# Patient Record
Sex: FEMALE | Race: BLACK OR AFRICAN AMERICAN | NOT HISPANIC OR LATINO | Employment: FULL TIME | URBAN - METROPOLITAN AREA
[De-identification: names, ages, dates, MRNs, and addresses within clinical notes are randomized per-mention and may not be internally consistent; named-entity substitution may affect disease eponyms.]

---

## 2018-11-21 ENCOUNTER — TELEPHONE (OUTPATIENT)
Dept: OBSTETRICS AND GYNECOLOGY | Facility: CLINIC | Age: 38
End: 2018-11-21

## 2018-11-21 NOTE — TELEPHONE ENCOUNTER
Left message on VM    Received call yesterday from Dr. Radha Eric's office requesting scheduling evaluation and surgery for joint case and sling in Jan.     Patient will need cystometrics at appointment per Dr. Hunter. Left message on patient answering machine to call office and schedule

## 2019-01-02 ENCOUNTER — TELEPHONE (OUTPATIENT)
Dept: OBSTETRICS AND GYNECOLOGY | Facility: CLINIC | Age: 39
End: 2019-01-02

## 2019-01-02 NOTE — TELEPHONE ENCOUNTER
Spoke with patient  Appointment for cystometrics rescheduled. Discussed this needs to be done prior to surgery on 1/21/19. Voiced understanding. New location reviewed.

## 2019-01-14 ENCOUNTER — TELEPHONE (OUTPATIENT)
Dept: OBSTETRICS AND GYNECOLOGY | Facility: CLINIC | Age: 39
End: 2019-01-14

## 2019-01-14 NOTE — TELEPHONE ENCOUNTER
----- Message from Rena Glass sent at 1/14/2019 10:10 AM CST -----  Contact: Page treviño/ Dr Radha Eric   Type: Needs Medical Advice    Who Called:  Page treviño/ Dr Radha Eric   Best Call Back Number: Page at , ext [phone]  Additional Information: Calling to speak with the Nurse in regards to the patient cancelling the appt on 1/21/19 with Dr Eric / Dr Hunter. Patient can't afford to take off from work, but she'll call back to reschedule. Please advise.     Surgery canceled by Dr. Eric's office. They are aware patient will need bladder testing and appt with Dr. Hunter prior to scheduling surgery.

## 2020-08-07 ENCOUNTER — LAB VISIT (OUTPATIENT)
Dept: PRIMARY CARE CLINIC | Facility: OTHER | Age: 40
End: 2020-08-07
Attending: INTERNAL MEDICINE
Payer: MEDICAID

## 2020-08-07 DIAGNOSIS — Z11.59 SPECIAL SCREENING EXAMINATION FOR UNSPECIFIED VIRAL DISEASE: Primary | ICD-10-CM

## 2020-08-07 PROCEDURE — U0003 INFECTIOUS AGENT DETECTION BY NUCLEIC ACID (DNA OR RNA); SEVERE ACUTE RESPIRATORY SYNDROME CORONAVIRUS 2 (SARS-COV-2) (CORONAVIRUS DISEASE [COVID-19]), AMPLIFIED PROBE TECHNIQUE, MAKING USE OF HIGH THROUGHPUT TECHNOLOGIES AS DESCRIBED BY CMS-2020-01-R: HCPCS

## 2020-08-12 DIAGNOSIS — U07.1 COVID-19 VIRUS DETECTED: ICD-10-CM

## 2020-08-12 LAB — SARS-COV-2 RNA RESP QL NAA+PROBE: DETECTED

## 2020-12-04 DIAGNOSIS — Z01.84 ANTIBODY RESPONSE EXAMINATION: ICD-10-CM

## 2021-01-11 ENCOUNTER — CLINICAL SUPPORT (OUTPATIENT)
Dept: URGENT CARE | Facility: CLINIC | Age: 41
End: 2021-01-11
Payer: MEDICAID

## 2021-01-11 ENCOUNTER — TELEPHONE (OUTPATIENT)
Dept: PRIMARY CARE CLINIC | Facility: OTHER | Age: 41
End: 2021-01-11

## 2021-01-11 DIAGNOSIS — R53.83 FATIGUE, UNSPECIFIED TYPE: Primary | ICD-10-CM

## 2021-01-11 DIAGNOSIS — R51.9 NONINTRACTABLE HEADACHE, UNSPECIFIED CHRONICITY PATTERN, UNSPECIFIED HEADACHE TYPE: ICD-10-CM

## 2021-01-11 DIAGNOSIS — R11.0 NAUSEA: ICD-10-CM

## 2021-01-11 DIAGNOSIS — R53.83 FATIGUE, UNSPECIFIED TYPE: ICD-10-CM

## 2021-01-11 DIAGNOSIS — M79.10 MYALGIA: ICD-10-CM

## 2021-01-11 LAB
CTP QC/QA: YES
SARS-COV-2 RDRP RESP QL NAA+PROBE: NEGATIVE

## 2021-01-11 PROCEDURE — U0002: ICD-10-PCS | Mod: QW,S$GLB,, | Performed by: INTERNAL MEDICINE

## 2021-01-11 PROCEDURE — U0002 COVID-19 LAB TEST NON-CDC: HCPCS | Mod: QW,S$GLB,, | Performed by: INTERNAL MEDICINE

## 2021-04-01 ENCOUNTER — IMMUNIZATION (OUTPATIENT)
Dept: FAMILY MEDICINE | Facility: CLINIC | Age: 41
End: 2021-04-01
Payer: COMMERCIAL

## 2021-04-01 DIAGNOSIS — Z23 NEED FOR VACCINATION: Primary | ICD-10-CM

## 2021-04-01 PROCEDURE — 0001A COVID-19, MRNA, LNP-S, PF, 30 MCG/0.3 ML DOSE VACCINE: ICD-10-PCS | Mod: CV19,S$GLB,, | Performed by: FAMILY MEDICINE

## 2021-04-01 PROCEDURE — 91300 COVID-19, MRNA, LNP-S, PF, 30 MCG/0.3 ML DOSE VACCINE: CPT | Mod: S$GLB,,, | Performed by: FAMILY MEDICINE

## 2021-04-01 PROCEDURE — 91300 COVID-19, MRNA, LNP-S, PF, 30 MCG/0.3 ML DOSE VACCINE: ICD-10-PCS | Mod: S$GLB,,, | Performed by: FAMILY MEDICINE

## 2021-04-01 PROCEDURE — 0001A COVID-19, MRNA, LNP-S, PF, 30 MCG/0.3 ML DOSE VACCINE: CPT | Mod: CV19,S$GLB,, | Performed by: FAMILY MEDICINE

## 2021-04-22 ENCOUNTER — IMMUNIZATION (OUTPATIENT)
Dept: FAMILY MEDICINE | Facility: CLINIC | Age: 41
End: 2021-04-22
Payer: COMMERCIAL

## 2021-04-22 DIAGNOSIS — Z23 NEED FOR VACCINATION: Primary | ICD-10-CM

## 2021-04-22 PROCEDURE — 0002A COVID-19, MRNA, LNP-S, PF, 30 MCG/0.3 ML DOSE VACCINE: CPT | Mod: PBBFAC | Performed by: FAMILY MEDICINE

## 2021-04-22 PROCEDURE — 91300 COVID-19, MRNA, LNP-S, PF, 30 MCG/0.3 ML DOSE VACCINE: CPT | Mod: PBBFAC | Performed by: FAMILY MEDICINE

## 2021-05-17 PROBLEM — E66.01 CLASS 3 SEVERE OBESITY DUE TO EXCESS CALORIES WITH SERIOUS COMORBIDITY AND BODY MASS INDEX (BMI) OF 40.0 TO 44.9 IN ADULT: Status: ACTIVE | Noted: 2021-05-17

## 2021-05-17 PROBLEM — E66.813 CLASS 3 SEVERE OBESITY DUE TO EXCESS CALORIES WITH SERIOUS COMORBIDITY AND BODY MASS INDEX (BMI) OF 40.0 TO 44.9 IN ADULT: Status: ACTIVE | Noted: 2021-05-17

## 2021-05-17 PROBLEM — G47.33 OBSTRUCTIVE SLEEP APNEA SYNDROME: Status: ACTIVE | Noted: 2021-05-17

## 2021-06-05 ENCOUNTER — PATIENT MESSAGE (OUTPATIENT)
Dept: ADMINISTRATIVE | Facility: OTHER | Age: 41
End: 2021-06-05

## 2021-06-22 ENCOUNTER — LAB VISIT (OUTPATIENT)
Dept: LAB | Facility: HOSPITAL | Age: 41
End: 2021-06-22
Attending: FAMILY MEDICINE
Payer: COMMERCIAL

## 2021-06-22 DIAGNOSIS — Z00.00 ROUTINE GENERAL MEDICAL EXAMINATION AT HEALTH CARE FACILITY: ICD-10-CM

## 2021-06-22 LAB
ALBUMIN SERPL BCP-MCNC: 3.5 G/DL (ref 3.5–5.2)
ALP SERPL-CCNC: 72 U/L (ref 55–135)
ALT SERPL W/O P-5'-P-CCNC: 21 U/L (ref 10–44)
ANION GAP SERPL CALC-SCNC: 10 MMOL/L (ref 8–16)
AST SERPL-CCNC: 23 U/L (ref 10–40)
BASOPHILS # BLD AUTO: 0.05 K/UL (ref 0–0.2)
BASOPHILS NFR BLD: 0.5 % (ref 0–1.9)
BILIRUB SERPL-MCNC: 0.3 MG/DL (ref 0.1–1)
BUN SERPL-MCNC: 16 MG/DL (ref 6–20)
CALCIUM SERPL-MCNC: 9.5 MG/DL (ref 8.7–10.5)
CHLORIDE SERPL-SCNC: 106 MMOL/L (ref 95–110)
CHOLEST SERPL-MCNC: 182 MG/DL (ref 120–199)
CHOLEST/HDLC SERPL: 4.1 {RATIO} (ref 2–5)
CO2 SERPL-SCNC: 25 MMOL/L (ref 23–29)
CREAT SERPL-MCNC: 1.2 MG/DL (ref 0.5–1.4)
DIFFERENTIAL METHOD: ABNORMAL
EOSINOPHIL # BLD AUTO: 0.3 K/UL (ref 0–0.5)
EOSINOPHIL NFR BLD: 2.7 % (ref 0–8)
ERYTHROCYTE [DISTWIDTH] IN BLOOD BY AUTOMATED COUNT: 13.1 % (ref 11.5–14.5)
EST. GFR  (AFRICAN AMERICAN): >60 ML/MIN/1.73 M^2
EST. GFR  (NON AFRICAN AMERICAN): 55.9 ML/MIN/1.73 M^2
GLUCOSE SERPL-MCNC: 90 MG/DL (ref 70–110)
HCT VFR BLD AUTO: 41.2 % (ref 37–48.5)
HDLC SERPL-MCNC: 44 MG/DL (ref 40–75)
HDLC SERPL: 24.2 % (ref 20–50)
HGB BLD-MCNC: 12.7 G/DL (ref 12–16)
IMM GRANULOCYTES # BLD AUTO: 0.03 K/UL (ref 0–0.04)
IMM GRANULOCYTES NFR BLD AUTO: 0.3 % (ref 0–0.5)
LDLC SERPL CALC-MCNC: 111.6 MG/DL (ref 63–159)
LYMPHOCYTES # BLD AUTO: 3.4 K/UL (ref 1–4.8)
LYMPHOCYTES NFR BLD: 35.3 % (ref 18–48)
MCH RBC QN AUTO: 26.6 PG (ref 27–31)
MCHC RBC AUTO-ENTMCNC: 30.8 G/DL (ref 32–36)
MCV RBC AUTO: 86 FL (ref 82–98)
MONOCYTES # BLD AUTO: 0.7 K/UL (ref 0.3–1)
MONOCYTES NFR BLD: 6.9 % (ref 4–15)
NEUTROPHILS # BLD AUTO: 5.2 K/UL (ref 1.8–7.7)
NEUTROPHILS NFR BLD: 54.3 % (ref 38–73)
NONHDLC SERPL-MCNC: 138 MG/DL
NRBC BLD-RTO: 0 /100 WBC
PLATELET # BLD AUTO: 306 K/UL (ref 150–450)
PMV BLD AUTO: 10.2 FL (ref 9.2–12.9)
POTASSIUM SERPL-SCNC: 4.1 MMOL/L (ref 3.5–5.1)
PROT SERPL-MCNC: 7.6 G/DL (ref 6–8.4)
RBC # BLD AUTO: 4.77 M/UL (ref 4–5.4)
SODIUM SERPL-SCNC: 141 MMOL/L (ref 136–145)
TRIGL SERPL-MCNC: 132 MG/DL (ref 30–150)
TSH SERPL DL<=0.005 MIU/L-ACNC: 1.06 UIU/ML (ref 0.4–4)
WBC # BLD AUTO: 9.51 K/UL (ref 3.9–12.7)

## 2021-06-22 PROCEDURE — 36415 COLL VENOUS BLD VENIPUNCTURE: CPT | Mod: PO | Performed by: FAMILY MEDICINE

## 2021-06-22 PROCEDURE — 80061 LIPID PANEL: CPT | Performed by: FAMILY MEDICINE

## 2021-06-22 PROCEDURE — 84443 ASSAY THYROID STIM HORMONE: CPT | Performed by: FAMILY MEDICINE

## 2021-06-22 PROCEDURE — 85025 COMPLETE CBC W/AUTO DIFF WBC: CPT | Performed by: FAMILY MEDICINE

## 2021-06-22 PROCEDURE — 80053 COMPREHEN METABOLIC PANEL: CPT | Performed by: FAMILY MEDICINE

## 2021-07-08 ENCOUNTER — PATIENT MESSAGE (OUTPATIENT)
Dept: ADMINISTRATIVE | Facility: OTHER | Age: 41
End: 2021-07-08

## 2021-07-15 PROBLEM — L85.3 DRY SKIN DERMATITIS: Status: ACTIVE | Noted: 2021-07-15

## 2021-10-15 ENCOUNTER — LAB VISIT (OUTPATIENT)
Dept: PRIMARY CARE CLINIC | Facility: OTHER | Age: 41
End: 2021-10-15
Payer: MEDICAID

## 2021-10-15 DIAGNOSIS — R05.9 COUGH: ICD-10-CM

## 2021-10-15 PROCEDURE — U0003 INFECTIOUS AGENT DETECTION BY NUCLEIC ACID (DNA OR RNA); SEVERE ACUTE RESPIRATORY SYNDROME CORONAVIRUS 2 (SARS-COV-2) (CORONAVIRUS DISEASE [COVID-19]), AMPLIFIED PROBE TECHNIQUE, MAKING USE OF HIGH THROUGHPUT TECHNOLOGIES AS DESCRIBED BY CMS-2020-01-R: HCPCS | Performed by: FAMILY MEDICINE

## 2021-10-16 LAB
SARS-COV-2 RNA RESP QL NAA+PROBE: NOT DETECTED
SARS-COV-2- CYCLE NUMBER: NORMAL

## 2022-01-03 PROBLEM — M54.32 SCIATICA OF LEFT SIDE: Status: ACTIVE | Noted: 2022-01-03

## 2022-02-21 ENCOUNTER — LAB VISIT (OUTPATIENT)
Dept: LAB | Facility: HOSPITAL | Age: 42
End: 2022-02-21
Attending: FAMILY MEDICINE
Payer: COMMERCIAL

## 2022-02-21 DIAGNOSIS — I10 ESSENTIAL HYPERTENSION: ICD-10-CM

## 2022-02-21 LAB
ANION GAP SERPL CALC-SCNC: 11 MMOL/L (ref 8–16)
BUN SERPL-MCNC: 17 MG/DL (ref 6–20)
CALCIUM SERPL-MCNC: 9.1 MG/DL (ref 8.7–10.5)
CHLORIDE SERPL-SCNC: 103 MMOL/L (ref 95–110)
CO2 SERPL-SCNC: 25 MMOL/L (ref 23–29)
CREAT SERPL-MCNC: 1.1 MG/DL (ref 0.5–1.4)
EST. GFR  (AFRICAN AMERICAN): >60 ML/MIN/1.73 M^2
EST. GFR  (NON AFRICAN AMERICAN): >60 ML/MIN/1.73 M^2
GLUCOSE SERPL-MCNC: 90 MG/DL (ref 70–110)
POTASSIUM SERPL-SCNC: 4.4 MMOL/L (ref 3.5–5.1)
SODIUM SERPL-SCNC: 139 MMOL/L (ref 136–145)

## 2022-02-21 PROCEDURE — 80048 BASIC METABOLIC PNL TOTAL CA: CPT | Performed by: FAMILY MEDICINE

## 2022-02-21 PROCEDURE — 36415 COLL VENOUS BLD VENIPUNCTURE: CPT | Mod: PO | Performed by: FAMILY MEDICINE

## 2022-03-16 PROBLEM — Z71.89 ACP (ADVANCE CARE PLANNING): Status: ACTIVE | Noted: 2022-03-16

## 2022-03-16 PROBLEM — R07.9 CHEST PAIN: Status: ACTIVE | Noted: 2022-03-16

## 2022-05-05 ENCOUNTER — RESEARCH ENCOUNTER (OUTPATIENT)
Dept: RESEARCH | Facility: HOSPITAL | Age: 42
End: 2022-05-05
Payer: COMMERCIAL

## 2022-05-05 DIAGNOSIS — Z00.6 EXAMINATION OF PARTICIPANT IN CLINICAL TRIAL: Primary | ICD-10-CM

## 2022-05-05 NOTE — PROGRESS NOTES
May 5th, 2022     Protocol: Early Detection of Pancreatic Cancer: Prospective Study  IRB # 2018.105  PI: Bob Charlton MD  Version Date: 30-June-2020  Study # 212 ([employer]: 8-212)     Informed Consent Process for healthy/no disease participant:  Patient found to be eligible by Dr. Zoltan Morrow, associate clinical research coordinator, met with the patient to discuss the above-mentioned protocol. The patient is alert and oriented x 3. Mood and affect appropriate to the situation. Patient states that she is consenting to a sample of her blood for research purposes.     Purpose of the study reviewed with the patient. Patient states understanding of this information.     Length of study and number of participants reviewed with the patient.    Study procedures discussed in detail with the patient to include: screening and baseline assessments. Patient was informed that all information regarding screening and baseline assessments will be taken from her chart. Patient verbalized understanding of this information.   Patient responsibilities discussed in detail with patient. Pt also informed about what will happened to her test samples.  Pt voiced understanding.   Risks discussed with patient to include blood draws, psychological and economic in nature.  Pt voiced understanding.  Benefits, costs and/or payment reimbursement and source of funding all reviewed with the patient. The patient verbalized understanding of this information.  Study related questions/compensation for injury, and whom to contact regarding rights as a research subject all reviewed with the patient; patient verbalizes understanding of this information.   Patient was informed that her decision to participate in the trial would not affect her employment in any way. Patient voiced understanding.  Pt informed that she will be informed of any New Findings that may change her mind about staying in the study.  Explained to the patient that researchers  will be studying her DNA Sequencing in her blood samples. The patient was also informed that during the testing process there may be Unexpected Findings but since they are not of the same quality as she would receive during regular health care assessments she will not be informed of these findings.  Pt voiced understanding.     Voluntary participation and withdrawal from research stressed to patient; patient states she understands that she may withdraw consent at any time without compromise to her care.   Confidentiality and HIPAA discussed with patient; process of protection and security of database explained to patient; patient verbalizes understanding of this information. Informed the patient that detailed information on this trial can be found at www.clinicaltrials.gov.        Throughout the consenting process, the patient was given several opportunities to ask questions; all questions addressed and answered to patients satisfaction. Patient states her willingness to participate in the study; patient signed and dated the consent form; copy of the consent form given to patient along with my contact information.  Instructed patient to notify Dr. Charlton or the [facility] Institutional Review Board noted on page 4 for any questions and/or concerns.     Danuta Morrow [facility]

## 2022-06-02 ENCOUNTER — PATIENT MESSAGE (OUTPATIENT)
Dept: ADMINISTRATIVE | Facility: OTHER | Age: 42
End: 2022-06-02
Payer: COMMERCIAL

## 2022-06-10 ENCOUNTER — OFFICE VISIT (OUTPATIENT)
Dept: SPINE | Facility: CLINIC | Age: 42
End: 2022-06-10
Payer: COMMERCIAL

## 2022-06-10 VITALS
DIASTOLIC BLOOD PRESSURE: 96 MMHG | HEART RATE: 74 BPM | HEIGHT: 69 IN | SYSTOLIC BLOOD PRESSURE: 140 MMHG | BODY MASS INDEX: 43.4 KG/M2 | WEIGHT: 293 LBS

## 2022-06-10 DIAGNOSIS — M54.2 CERVICALGIA: Primary | ICD-10-CM

## 2022-06-10 DIAGNOSIS — M54.12 CERVICAL RADICULOPATHY: ICD-10-CM

## 2022-06-10 PROCEDURE — 99203 OFFICE O/P NEW LOW 30 MIN: CPT | Mod: S$GLB,,, | Performed by: PHYSICIAN ASSISTANT

## 2022-06-10 PROCEDURE — 99203 PR OFFICE/OUTPT VISIT, NEW, LEVL III, 30-44 MIN: ICD-10-PCS | Mod: S$GLB,,, | Performed by: PHYSICIAN ASSISTANT

## 2022-06-10 PROCEDURE — 1159F MED LIST DOCD IN RCRD: CPT | Mod: CPTII,S$GLB,, | Performed by: PHYSICIAN ASSISTANT

## 2022-06-10 PROCEDURE — 3008F PR BODY MASS INDEX (BMI) DOCUMENTED: ICD-10-PCS | Mod: CPTII,S$GLB,, | Performed by: PHYSICIAN ASSISTANT

## 2022-06-10 PROCEDURE — 4010F ACE/ARB THERAPY RXD/TAKEN: CPT | Mod: CPTII,S$GLB,, | Performed by: PHYSICIAN ASSISTANT

## 2022-06-10 PROCEDURE — 3080F PR MOST RECENT DIASTOLIC BLOOD PRESSURE >= 90 MM HG: ICD-10-PCS | Mod: CPTII,S$GLB,, | Performed by: PHYSICIAN ASSISTANT

## 2022-06-10 PROCEDURE — 1160F RVW MEDS BY RX/DR IN RCRD: CPT | Mod: CPTII,S$GLB,, | Performed by: PHYSICIAN ASSISTANT

## 2022-06-10 PROCEDURE — 1159F PR MEDICATION LIST DOCUMENTED IN MEDICAL RECORD: ICD-10-PCS | Mod: CPTII,S$GLB,, | Performed by: PHYSICIAN ASSISTANT

## 2022-06-10 PROCEDURE — 99999 PR PBB SHADOW E&M-EST. PATIENT-LVL III: ICD-10-PCS | Mod: PBBFAC,,, | Performed by: PHYSICIAN ASSISTANT

## 2022-06-10 PROCEDURE — 3080F DIAST BP >= 90 MM HG: CPT | Mod: CPTII,S$GLB,, | Performed by: PHYSICIAN ASSISTANT

## 2022-06-10 PROCEDURE — 3077F SYST BP >= 140 MM HG: CPT | Mod: CPTII,S$GLB,, | Performed by: PHYSICIAN ASSISTANT

## 2022-06-10 PROCEDURE — 3077F PR MOST RECENT SYSTOLIC BLOOD PRESSURE >= 140 MM HG: ICD-10-PCS | Mod: CPTII,S$GLB,, | Performed by: PHYSICIAN ASSISTANT

## 2022-06-10 PROCEDURE — 3008F BODY MASS INDEX DOCD: CPT | Mod: CPTII,S$GLB,, | Performed by: PHYSICIAN ASSISTANT

## 2022-06-10 PROCEDURE — 99999 PR PBB SHADOW E&M-EST. PATIENT-LVL III: CPT | Mod: PBBFAC,,, | Performed by: PHYSICIAN ASSISTANT

## 2022-06-10 PROCEDURE — 1160F PR REVIEW ALL MEDS BY PRESCRIBER/CLIN PHARMACIST DOCUMENTED: ICD-10-PCS | Mod: CPTII,S$GLB,, | Performed by: PHYSICIAN ASSISTANT

## 2022-06-10 PROCEDURE — 4010F PR ACE/ARB THEARPY RXD/TAKEN: ICD-10-PCS | Mod: CPTII,S$GLB,, | Performed by: PHYSICIAN ASSISTANT

## 2022-06-10 RX ORDER — TIZANIDINE 4 MG/1
4 TABLET ORAL EVERY 12 HOURS PRN
Qty: 40 TABLET | Refills: 0 | Status: SHIPPED | OUTPATIENT
Start: 2022-06-10 | End: 2024-01-12

## 2022-06-10 RX ORDER — METHYLPREDNISOLONE 4 MG/1
TABLET ORAL
Qty: 21 EACH | Refills: 0 | Status: SHIPPED | OUTPATIENT
Start: 2022-06-10 | End: 2022-07-01
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
## 2022-10-11 DIAGNOSIS — N83.209 OVARIAN CYST: Primary | ICD-10-CM

## 2022-10-11 DIAGNOSIS — N85.00 ENDOMETRIAL HYPERPLASIA: ICD-10-CM

## 2022-10-11 DIAGNOSIS — Z12.73 SPECIAL SCREENING FOR MALIGNANT NEOPLASMS, OVARY: ICD-10-CM

## 2022-12-05 ENCOUNTER — OFFICE VISIT (OUTPATIENT)
Dept: UROLOGY | Facility: CLINIC | Age: 42
End: 2022-12-05
Payer: COMMERCIAL

## 2022-12-05 VITALS — HEIGHT: 69 IN | BODY MASS INDEX: 43.4 KG/M2 | WEIGHT: 293 LBS

## 2022-12-05 DIAGNOSIS — N39.41 URGE URINARY INCONTINENCE: ICD-10-CM

## 2022-12-05 DIAGNOSIS — N39.3 SUI (STRESS URINARY INCONTINENCE, FEMALE): ICD-10-CM

## 2022-12-05 DIAGNOSIS — N39.46 MIXED STRESS AND URGE URINARY INCONTINENCE: Primary | ICD-10-CM

## 2022-12-05 PROCEDURE — 3008F BODY MASS INDEX DOCD: CPT | Mod: CPTII,S$GLB,, | Performed by: STUDENT IN AN ORGANIZED HEALTH CARE EDUCATION/TRAINING PROGRAM

## 2022-12-05 PROCEDURE — 1159F MED LIST DOCD IN RCRD: CPT | Mod: CPTII,S$GLB,, | Performed by: STUDENT IN AN ORGANIZED HEALTH CARE EDUCATION/TRAINING PROGRAM

## 2022-12-05 PROCEDURE — 1159F PR MEDICATION LIST DOCUMENTED IN MEDICAL RECORD: ICD-10-PCS | Mod: CPTII,S$GLB,, | Performed by: STUDENT IN AN ORGANIZED HEALTH CARE EDUCATION/TRAINING PROGRAM

## 2022-12-05 PROCEDURE — 99204 PR OFFICE/OUTPT VISIT, NEW, LEVL IV, 45-59 MIN: ICD-10-PCS | Mod: S$GLB,,, | Performed by: STUDENT IN AN ORGANIZED HEALTH CARE EDUCATION/TRAINING PROGRAM

## 2022-12-05 PROCEDURE — 3008F PR BODY MASS INDEX (BMI) DOCUMENTED: ICD-10-PCS | Mod: CPTII,S$GLB,, | Performed by: STUDENT IN AN ORGANIZED HEALTH CARE EDUCATION/TRAINING PROGRAM

## 2022-12-05 PROCEDURE — 99204 OFFICE O/P NEW MOD 45 MIN: CPT | Mod: S$GLB,,, | Performed by: STUDENT IN AN ORGANIZED HEALTH CARE EDUCATION/TRAINING PROGRAM

## 2022-12-05 PROCEDURE — 99999 PR PBB SHADOW E&M-EST. PATIENT-LVL III: CPT | Mod: PBBFAC,,, | Performed by: STUDENT IN AN ORGANIZED HEALTH CARE EDUCATION/TRAINING PROGRAM

## 2022-12-05 PROCEDURE — 99999 PR PBB SHADOW E&M-EST. PATIENT-LVL III: ICD-10-PCS | Mod: PBBFAC,,, | Performed by: STUDENT IN AN ORGANIZED HEALTH CARE EDUCATION/TRAINING PROGRAM

## 2022-12-05 PROCEDURE — 87086 URINE CULTURE/COLONY COUNT: CPT | Performed by: STUDENT IN AN ORGANIZED HEALTH CARE EDUCATION/TRAINING PROGRAM

## 2022-12-05 PROCEDURE — 4010F PR ACE/ARB THEARPY RXD/TAKEN: ICD-10-PCS | Mod: CPTII,S$GLB,, | Performed by: STUDENT IN AN ORGANIZED HEALTH CARE EDUCATION/TRAINING PROGRAM

## 2022-12-05 PROCEDURE — 4010F ACE/ARB THERAPY RXD/TAKEN: CPT | Mod: CPTII,S$GLB,, | Performed by: STUDENT IN AN ORGANIZED HEALTH CARE EDUCATION/TRAINING PROGRAM

## 2022-12-05 RX ORDER — OXYBUTYNIN CHLORIDE 10 MG/1
10 TABLET, EXTENDED RELEASE ORAL DAILY
Qty: 30 TABLET | Refills: 11 | Status: SHIPPED | OUTPATIENT
Start: 2022-12-05 | End: 2023-04-07

## 2022-12-05 NOTE — PROGRESS NOTES
"[place] - Urology   Clinic Note    Subjective:     Chief Complaint: Urinary Incontinence    History of Present Illness:  Jean Claude Dye is a 42 y.o. female who presents to clinic for evaluation and management of urinary incontinence. She is new to our clinic.    She complains of symptoms of urinary urgency onset 1.5 years. She reports daytime frequency of 5-6x daily, and night-time frequency of 0-1x per night. There is urgency urinary incontinence. She reports symptoms of stress urinary incontinence with laughing, coughing, and with intercourse. She requires daily pads (5 pads/day)..   She reports urinary incontinence is day and night but more predominant during the day.    She has decreased overall fluid intake.  She drinks juices, tea, soda, coffee, water. Occasional alcohol.     Her history includes no notation of urolithiasis, hematuria, prior pelvic surgery, previous prolapse or incontinence procedures or neurological symptoms/diagnoses. She denies all other prior pelvic surgeries or neurologic diagnoses. 2 previous vaginal deliveries.    She does not report symptoms suggestive of advanced POP.     She denies a history of constipation.     Previous incontinence therapies:  Behavioral Therapy: (pelvic floor exercises fluid/dietary modification) -  provided no benefit     Past medical, family, surgical and social history reviewed as documented in chart with pertinent positive medical, family, surgical and social history detailed in HPI.    A review systems was conducted with pertinent positive and negative findings documented in HPI.    Objective:     Estimated body mass index is 46.52 kg/m² as calculated from the following:    Height as of this encounter: 5' 9" (1.753 m).    Weight as of this encounter: 142.9 kg (315 lb).    Vital Signs (Most Recent)       Physical Exam  Vitals and nursing note reviewed.   Constitutional:       General: She is not in acute distress.     Appearance: She is well-developed. She " is not ill-appearing or toxic-appearing.   Pulmonary:      Effort: Pulmonary effort is normal. No accessory muscle usage or respiratory distress.   Neurological:      General: No focal deficit present.      Mental Status: She is alert and oriented to person, place, and time. Mental status is at baseline.   Psychiatric:         Mood and Affect: Mood normal.         Behavior: Behavior normal.         Thought Content: Thought content normal.         Judgment: Judgment normal.       Lab Results   Component Value Date    BUN 19 (H) 03/18/2022    CREATININE 1.11 03/18/2022    WBC 10.44 03/18/2022    HGB 12.3 03/18/2022    HCT 39.7 03/18/2022     03/18/2022    AST 33 03/18/2022    ALT 28 03/18/2022    ALKPHOS 81 03/18/2022    ALBUMIN 4.1 03/18/2022      Urine dipstick today showed trace blood, trace leukocyte esterase, and negative nitrite.     Assessment:     1. Mixed stress and urge urinary incontinence    2. Urge urinary incontinence    3. ADALGISA (stress urinary incontinence, female)      Plan:     Behavioral modifications and interventions to help with urinary issues include limiting intake of the following: fluids prior to bedtime or travel; mild diuretics, such as caffeine and alcohol; and bladder irritants, such as highly seasoned or irritative foods. Other interventions include avoiding constipation, increasing physical activity, weight loss, Kegel exercises at time of urinary urgency, timed voiding regimens, and double-voiding techniques.     She can not definitively determine if the stress or urge is more predominant, she does report significant urgency symptoms.  Discussed bladder physiology and etiology of her symptoms.  Discussed above behavioral interventions, referral to physical therapy, procedural interventions such as a mid urethral sling or periurethral bulking agent.  For the urgency symptoms we discussed medications, Botox injections, sacral neuromodulation.      Referral pelvic floor physical  therapy, information provided about Kegel exercises.  Oxybutynin prescription sent to her pharmacy.    Follow-up in 3 months    Jamaal Mercado MD

## 2022-12-06 LAB
BACTERIA UR CULT: NORMAL
BACTERIA UR CULT: NORMAL

## 2023-01-05 ENCOUNTER — PATIENT MESSAGE (OUTPATIENT)
Dept: ADMINISTRATIVE | Facility: OTHER | Age: 43
End: 2023-01-05
Payer: COMMERCIAL

## 2023-01-13 ENCOUNTER — CLINICAL SUPPORT (OUTPATIENT)
Dept: REHABILITATION | Facility: HOSPITAL | Age: 43
End: 2023-01-13
Attending: STUDENT IN AN ORGANIZED HEALTH CARE EDUCATION/TRAINING PROGRAM
Payer: COMMERCIAL

## 2023-01-13 DIAGNOSIS — N39.3 SUI (STRESS URINARY INCONTINENCE, FEMALE): ICD-10-CM

## 2023-01-13 DIAGNOSIS — N39.46 MIXED STRESS AND URGE URINARY INCONTINENCE: ICD-10-CM

## 2023-01-13 DIAGNOSIS — M62.89 PELVIC FLOOR TENSION: ICD-10-CM

## 2023-01-13 DIAGNOSIS — N39.41 URGE URINARY INCONTINENCE: ICD-10-CM

## 2023-01-13 PROCEDURE — 97162 PT EVAL MOD COMPLEX 30 MIN: CPT | Mod: PN

## 2023-01-13 PROCEDURE — 97112 NEUROMUSCULAR REEDUCATION: CPT | Mod: PN

## 2023-01-13 NOTE — PATIENT INSTRUCTIONS
Home Exercise Program: 01/13/2023      BLADDER HEALTH      WHAT IS CONSIDERED NORMAL?  The average bladder can hold about 2 cups of urine before it needs to be emptied.  The normal range of voiding urine is 6 to 8 times during a 24 hour period, or every 3-4 hours. As we get older, our bladder capacity can get smaller and we may need to pass urine more frequently but usually not more than every 2 hours.  Urine should flow easily without discomfort in a good, steady stream until the bladder is empty. No pushing or straining is necessary to empty the bladder.  An urge is a normal signal that you feel as the bladder stretches to fill with urine. Urges can be felt even if the bladder is not full. Urges are not commands to go to the toilet, merely a signal and can be controlled.    WHAT ARE GOOD BLADDER HABITS?  Take your time when emptying your bladder. Dont strain or push to empty your bladder. Make sure you empty your bladder completely each time you pass urine. Do not rush the process.   Dont wait too long - consistently ignoring the urge to go (waiting more than 4 hours between toileting) or urinating too infrequently may be convenient but not healthy for your bladder. You can actually overstretch your bladder beyond its normal size.   Dont go too often - avoid going to the toilet just in case (more often than every 2 hours). It is usually not necessary to go when you feel the first urge. Try to go only when your bladder is full. Dont let your bladder control your life.    DIET CAN AFFECT THE BLADDER  Maintain a healthy fluid intake. Many people decrease their intake of liquids in hopes that they will need to urinate less frequently or have less urinary leakage. While a decrease in liquid intake does result in a decrease in the volume of urine, the smaller amount of urine may be more highly concentrated. Highly concentrated, dark yellow urine is irritating to the bladder surface and may actually cause you to go  to the bathroom more frequently. It also encourages the growth of bacteria, which may lead to infections resulting in incontinence.  Depending on your body size and environment, drink 4-8 cups (8 oz each) of fluid per day, spread throughout the entire day, unless otherwise advised by your doctor.    Avoid of use the following acidic food/beverages in moderation:  Alcoholic beverages    Tomato-based products  Vinegar  Coffee (regular and decaf)  Tea (regular and decaf)  Oneal  Citrus fruits and juices  Spicy foods  Caffeinated beverages  Cola  Milk  Food colorings and flavorings  Artificial sweeteners  Chocolate    Try using these dietary substitutions:  Water: grape juice, apple juice  Fruit: pears, apricots, papaya, watermelon  Coffee: KAVA®, Postum®, Sean®, Kaffree Limington®  Tea: Non-citrus herbal, sun-brewed tea  Vitamin C: Calcium carbonate co-buffered with calcium ascorbate    Avoid constipation by maintaining a balanced diet of dietary fiber. Typical dietary recommendations for fiber are between 25-35 grams per day. You should discuss your fiber needs with your physician, pharmacist or nutritionist.    Stop smoking. Smoking is irritating to the bladder surface and is associated with bladder cancer. In addition, the coughing associated with smoking may lead to increased incontinent episodes.      Home Exercise Program: 01/13/2023    DIAPHRAGMATIC BREATHING     The diaphragm is a dome shaped muscle that forms the floor of the rib cage. It is the most efficient muscle for breathing and relaxation, although most people are not used to using the diaphragm. Diaphragmatic or belly breathing is an important technique to learn because it helps settle down or relax the autonomic nervous system. The correct use of diaphragmatic breathing can help to quiet brain activity resulting in the relaxation of all the muscles and organs of the body. This is accomplished by slow rhythmic breathing concentrated in the diaphragm muscle  rather than the chest.    How to do proper relaxation breathing:    Start by lying on your back or reclining in a chair in a relaxed position. Place one hand on your chest and the other on your abdomen.  Relax your jaw by placing your tongue on the roof of your mouth and keeping your teeth slightly apart.   Take a deep breath in, letting the abdomen expand and rise while you keep your upper chest, neck and shoulders relaxed.   As you breathe out, allow your abdomen and chest to fall. Exhale completely.  It doesn't matter if you breathe in/out through your nose and/or mouth. Do whichever feels comfortable.  Remember to breathe slowly.  Do not force your breathing. Do not hold your breath.  Repeat for 5 minutes every day.       Home Exercise Program: 01/13/2023    CONTROLLING URINARY / FECAL URGENCY    What to do when you experience a strong urge to urinate or defecate:     FIRST  Stop activity, stand quietly or sit down. Try to stay very still to maintain control. Avoid rushing to the toilet.    SECOND Begin Quick Flicks (1 second LIFT of pelvic floor muscles, 4 second DROP). Pelvic floor contractions send a message to the bladder to relax and hold urine.     THIRD Relax. Do not rush to the toilet. Take a deep belly or diaphragmatic breath and let it out slowly. Let the urge to urinate pass by using distraction techniques and positive thoughts. Try not to think about going to the bathroom.     FINALLY If the urge returns, repeat the above steps to regain control. When you feel the urge subside, walk normally to the bathroom. You can urinate once the urge has subsided.        The urge feeling strikes!   Stop, breathe, and be still and then begin Quick Flicks     Do Not rush to the toilet.     Think positively and distract yourself.

## 2023-01-13 NOTE — PLAN OF CARE
[facility]  Pelvic Health Physical Therapy Initial Evaluation    Date: 2023   Name: Jean Claude Dye  Clinic Number: [id number]  Therapy Diagnosis:   Encounter Diagnoses   Name Primary?    Urge urinary incontinence     ADALGISA (stress urinary incontinence, female)     Mixed stress and urge urinary incontinence     Pelvic floor tension      Physician: Jamaal Mercado MD    Physician Orders: PT Eval and Treat   Medical Diagnosis from Referral:   N39.46 (ICD-10-CM) - Mixed stress and urge urinary incontinence  Evaluation Date: 2023  Authorization Period Expiration: 23  Plan of Care Expiration: 3/24/23  Progress Note Due: 23  Visit # / Visits authorized:     FOTO: Issued Visit # 1    Time In: 1:00  Time Out: 1:50  Total Appointment Time (timed & untimed codes): 50 minutes    Precautions: universal    Subjective     Date of onset: 1 1/2 years ago    History of current condition - Jean Claude reports:   Pt reports ADALGISA and UUI for about 1.5 years.   ADALGISA with cough, laugh and with intercourse. Some leakage at night.   Gets a sudden urge to urinate and can't always get to the bathroom in time.   Limits water at work due to frequency  Also leaks at night (coughs throughout the night)    MD presccribed Oxybutynin - has been taking for 5 weeks - helping frequency some but doesn't like side effects    OB/GYN History:    Had a urethral tear with first delivery  Sexually active? Yes  Pain with vaginal exams, intercourse or tampon use? No but leaked with intercourse    Bladder/Bowel History:   Frequency of urination:   Daytime: 5-6x/day           Nighttime: 0-1x  Difficulty initiating urine stream: Yes  Urine stream: strong  Complete emptying: No  Bladder leakage: Yes  Frequency of incidents: Multiple times a day  Amount leaked (urine): few drops, teaspoon(s), small squirt , large squirt, and moderate amount  Urinary Urgency: Yes  Able to delay the urge for at least 0 minute(s).  Frequency of bowel  movements: once a day  Difficulty initiating BM: No  Quality/Shape of BM: [place] Stool Chart 3-4  Does Patient Feel Empty after BM? Yes  Fiber Supplements or Laxative Use?  Yes - Milk of Magnesia - as needed  Colon leakage: No  Form of protection: #6 pad  Number of pads required in 24 hours: 5 and pads are wet every time    PAIN:  Location: Pelvis - has cysts on Fallopian tubes  Current 0/10, worst 8/10, best 0/10   Description: Dull  Aggravating Factors/Activities that cause symptoms:  Walking  Easing Factors: Rest     Medical History: Jean Claude  has a past medical history of Hypertension.     Surgical History:  Jean Claude Dye  has no past surgical history on file.    Medications: Jean Claude has a current medication list which includes the following prescription(s): bupropion, famotidine, fluconazole, hydrochlorothiazide, metronidazole, olmesartan, oxybutynin, and tizanidine.    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  Prior Level of Function: Independent  Current Level of Function: UI affects function at work; not sexually intimate with  due to UI with intercourse    Types of fluid intake: water 64oz; iced coffee 1 cup ; occasional coke, tea with sugar  Diet: regular   Habitus:overweight  Abuse/Neglect: No     Pts goals: improve UI    OBJECTIVE     See EMR under MEDIA for written consent provided 1/13/2023  Chaperone: declined    ORTHO SCREEN  Posture in sitting: sacral sitting  Posture in standing: increased lordosis and anterior pelvic tilt  Pelvic alignment: no sign of deviations noted in supine   SI Joint Palpation: Denies tenderness to SI joint palpation bilaterally.  Sacral spring test: nt (Positive=NO  spring)  Adductor Palpation: increased tension   Lumbar AROM : flexion is limited 50%    ABDOMINAL WALL ASSESSMENT  Palpation: boggy  Abdominal strength: Rectus abdominus: 3+/5     Transverse abdominus: 3+/5  Scarring: none  Pelvic Floor Muscle and Transverse Abdominus Synergy: present  Diastasis: present 2 finger width above and below umbilicus with poor tension underneath     BREATHING MECHANICS ASSESSMENT   Thorax Assessment During Quiet Respiration: Decreased excursion of abdominal wall  and Decreased excursion bilaterally of lateral ribs   Thorax Assessment During Deep Respiration: Decreased excursion of abdominal wall  and Decreased excursion bilaterally of lateral ribs     VAGINAL PELVIC FLOOR EXAM    EXTERNAL ASSESSMENT  Introitus: WNL  Skin condition: WNL  Scarring: episiotomy scar noted   Sensation: WNL   Pain:  none  Voluntary contraction: visible lift  Voluntary relaxation: visible drop  Involuntary contraction: reflex tightening  Bearing down: visible drop  Perineal descent: absent        INTERNAL ASSESSMENT  Pain: none but tension palpated bilateral levator ani  Sensation: able to localized pressure appropriately   Vaginal vault: stenotic  Muscle Bulk: hypertonus   Muscle Power: 4/5  Muscle Endurance: 5 sec  # Reps To Fatigue: 10    Fast Contractions in 10 seconds: nt     Quality of contraction: slow relaxation and incomplete relaxation   Specificity: WNL   Coordination: tends to hold breath during PFM contration   Prolapse check:none visualized    Limitation/Restriction for FOTO Bladder Survey    Therapist reviewed FOTO scores for Jean Claude Dye on 1/13/2023.   FOTO documents entered into [employer] - see Media section.    Limitation Score: 63%       TREATMENT     Treatment Time In: 1:35  Treatment Time Out: 1:50  Total Treatment time (time-based codes) separate from Evaluation: 15 minutes    Neuromuscular Re-education to develop Coordination, Control, Down training, and Proprioception for 15 minutes  including: pelvic floor relaxation/bulging training, diaphragmatic breathing, and urge delay strategies     Patient Education provided:   general anatomy/physiology of urinary/ bowel  system and benefits of treatment were discussed with the pt. Additionally, bladder irritants, anatomy/physiology of pelvic floor, diaphragmatic breathing, behavior modifications, and urge delay strategies were reviewed.     Home Exercises Provided:  Written Home Exercises Provided: yes.  Exercises were reviewed and Jean Claude was able to demonstrate them prior to the end of the session.    Jean Claude demonstrated good  understanding of the education provided.     See EMR under Patient Instructions for exercises provided 1/13/2023.    Assessment     Jean Claude is a 42 y.o. female referred to outpatient Physical Therapy with a medical diagnosis of mixed UI. The patient reports chronic and worsening ADALGISA and UUI incontinence that is affecting ADLs and social participation. Examination reveals pelvic floor dysfunction including weakness, decreased endurance and coordination deficits along with lumbopelvic dysfunction and decreased lower extremity strength. Pt is also noted to have abdominal wall and lower extremity weakness.  Pt also reports a history of chronic low back pain that affects ADL participation. The patient is expected to benefit from skilled intervention to work towards elimination of ADALGISA and UUI needed to improve quality of life and ADLs participation and return towards prior level of function.      Pt prognosis is Good.   Pt will benefit from skilled outpatient Physical Therapy to address the deficits stated above and in the chart below, provide pt/family education, and to maximize pt's level of independence.     Plan of care discussed with patient: Yes  Pt's spiritual, cultural and educational needs considered and patient is agreeable to the plan of care and goals as stated below:       Anticipated Barriers for therapy: none    Medical  "Necessity is demonstrated by the following    History  Co-morbidities and personal factors that may impact the plan of care Co-morbidities:   consumption of bladder irritants, high BMI, and HTN    Personal Factors:   lifestyle     moderate   Examination  Body Structures and Functions, activity limitations and participation restrictions that may impact the plan of care Body Regions/Systems/Functions:  poor knowledge of body mechanics and posture, decreased endurance of the pelvic muscles, increased tension of the pelvic muscles, increased frequency of urination, increased nocturia, poor coordination of pelvic floor muscles during ADL's leading to urinary or fecal leakage, poor fluid intake, and dysfunctional voiding     Activity Limitations:  urgency , sleep uninterrupted by excessive nocturia, incontinence with ADLs, bathroom mapping, and intercourse due to UI    Participation Restrictions:  all ADLs/iADLs uninterrupted by urinary incontinence/urgency/frequency, social activities with friends/family, relationship with spouse/partner, work duties, and Sleep restrictions    Activity limitations:   Learning and applying knowledge  no deficits    General Tasks and Commands  no deficits    Communication  no deficits    Mobility  lifting and carrying objects  walking    Self care  toileting    Domestic Life  doing house work (cleaning house, washing dishes, laundry)    Interactions/Relationships  intimate relationships    Life Areas  no deficits    Community and Social Life  community life  recreation and leisure       moderate   Clinical Presentation evolving clinical presentation with changing clinical characteristics moderate   Decision Making/ Complexity Score: moderate       Goals:  Short Term Goals: 5 weeks   Pt to be edu pelvic muscle bracing and be able to consistently perform correctly and quickly to help decrease incontinence with cough/laugh/sneeze.  Pt to perform "the knack" prior to coughing, laughing or " sneezing to decrease risk of incontinence.  Pt to report a decrease in pad usage to no more than 3 a day to demonstrate improving pelvic floor function needed for continence.  Pt to be able to perform a 5  second kegel x 10 reps to demonstrate improving strength and endurance needed for continence.  Pt to be able to delay the urge to urinate at least 3 minutes with a strong urge to urinate in order to make it to the bathroom without leaking.  Pt to report being able to sneeze without incontinence demonstrating improved pelvic floor strength and coordination to improve confidence in social situations  Pt to voice understanding of the role that diet plays on urinary urgency.           Long Term Goals: 10 week  Pt to be discharged with home plan for carry over after discharge.    Pt to be able to perform a 10 second kegel x 10 reps to demonstrate improving strength and endurance needed for continence.  Pt to report a decrease in pad usage to 1 pad a day to demonstrate improving pelvic floor muscle controls as evidenced by decreased episodes of incontinence needed to improve confidence in social situations.  5t to be able to delay the urge to urinate at least 5 minutes with a strong urge to urinate in order to make it to the bathroom without leaking.  Pt to report a decrease in urinary frequency from every 90 minutes to no more than once every 2 hours to improve ability to participate in social activities.  Pt to report elimination of incontinence with ADLs to demonstrate improved pelvic floor muscle strength and coordination  Pt to report ability to have intercourse without urinary incontinence.  Pt to demonstrate an improved score in the FOTO Bladder survey  to at least 46% to demonstrate improving continence.    Pt to increase pelvic floor and abdominal strength to at least 4+/5 to demonstrate improved strength needed for continence with ADLs.       Plan     Plan of care Certification: 1/13/2023 to  3/24/2023.    Outpatient Physical Therapy 1 times weekly for 10 weeks to include the following interventions: therapeutic exercises, therapeutic activity, neuromuscular re-education, manual therapy, modalities PRN, patient/family education, and self care/home management    Cinthya Basilio, PT

## 2023-03-22 ENCOUNTER — CLINICAL SUPPORT (OUTPATIENT)
Dept: REHABILITATION | Facility: HOSPITAL | Age: 43
End: 2023-03-22
Payer: COMMERCIAL

## 2023-03-22 DIAGNOSIS — M62.89 PELVIC FLOOR TENSION: Primary | ICD-10-CM

## 2023-03-22 PROCEDURE — 97140 MANUAL THERAPY 1/> REGIONS: CPT | Mod: PN

## 2023-03-22 PROCEDURE — 97530 THERAPEUTIC ACTIVITIES: CPT | Mod: PN

## 2023-03-22 PROCEDURE — 97110 THERAPEUTIC EXERCISES: CPT | Mod: PN

## 2023-03-22 NOTE — PATIENT INSTRUCTIONS
"            Home Exercise Program: 3/22/2023    "CHECK IN" WITH YOUR PELVIC FLOOR  - Every hour, "check in" with your pelvic floor.   Is it tight and contracted?   Is it relaxed and dropped?  - Take 10 seconds and try to release any tension in the pelvic floor muscles and external anal sphincter.   Diaphragmatic Breathing   One quick flick   Mindfulness   Full body relaxation  - Then return to your regular tasks.    Do this every hour throughout the day in any position.    "

## 2023-03-22 NOTE — PROGRESS NOTES
Pelvic Health Physical Therapy   Treatment/ Updated POC Note     Name: Jean Claude Dye  Clinic Number: [id number]    Therapy Diagnosis:   Encounter Diagnosis   Name Primary?    Pelvic floor tension Yes     Physician: Jamaal Mercado MD    Visit Date: 3/22/2023    Physician Orders: PT Eval and Treat   Medical Diagnosis from Referral:   N39.46 (ICD-10-CM) - Mixed stress and urge urinary incontinence  Evaluation Date: 1/13/2023  Authorization Period Expiration: 12/5/23  Plan of Care Expiration: 6/16/23  Progress Note Due: 4/22/23  Visit # / Visits authorized: 2/12     FOTO: Issued Visit # 1  Time In: 7:00  Time Out: 7:55  Total Billable Time: 55 minutes    Precautions: Standard    Subjective     Pt reports: symptoms are a little better; taking oxybutinin inconsistently  Feels like she is emptying better with double void techniques  Trying to reduce sodas - notes that this is a trigger for her bladder.     Frequency of urination:   Daytime: 5-6x/day                                       Nighttime: 0-1x  Difficulty initiating urine stream: Yes  Urine stream: strong  Complete emptying: this is better with double void  Bladder leakage: Yes  Frequency of incidents: Multiple times a day  Amount leaked (urine): few drops, teaspoon(s), small squirt , large squirt, and moderate amount  Urinary Urgency: Yes  Able to delay the urge for at least 5 min (improved from 0 minute(s).  Frequency of bowel movements: once a day  Difficulty initiating BM: No  Quality/Shape of BM: Cass Stool Chart 3-4  Does Patient Feel Empty after BM? Yes  Fiber Supplements or Laxative Use?  Yes - Milk of Magnesia - as needed  Colon leakage: No  Form of protection: #6 pad  Number of pads required in 24 hours: 3 a day  (Improved from 5)       She was compliant with home exercise program.  Response to previous treatment: Intial eval  Functional change: Improved urgency - able to hold to make it to toilet; using fewer pads    PAIN:  Location: Pelvis - has  "cysts on Fallopian tubes  Current 0/10, worst 8/10, best 0/10     Constitutional Symptoms Review: The patient denies having any constitutional symptoms.     Objective   Pt verbally consents to intravaginal treatment today.  Signed consent form already on file.     INTERNAL ASSESSMENT  Pain: none but tension palpated bilateral levator ani  Sensation: able to localize pressure appropriately   Vaginal vault: stenotic  Muscle Bulk: hypertonus   Muscle Power: 4/5  Muscle Endurance: 5 sec  # Reps To Fatigue: 10              Fast Contractions in 10 seconds: nt                          Quality of contraction: slow relaxation and incomplete relaxation   Specificity: WNL   Coordination: tends to hold breath during PFM contration   Prolapse check:none visualized    Jean Claude received therapeutic exercises to develop  ROM and flexibility for 22 minutes including:   PPT with glute squeeze 1x10 hold 3"  Seated adductor stretch 1 min  Seated glute/piriformis stretch 30"x2 B  Standing HS stretch 1 min      Jean Claude received the following manual therapy techniques: to develop extensibility for 25 minutes including: trigger point/myofascial release of bilateral levator ani. Pt with moderate tension noted bilaterally. + response to release with pt performing diaphragmatic breathing.   -Adductor TPR with massage gun.    Neuromuscular Re-education to develop Coordination, Control, Down training, and Proprioception for 0 minutes including: pelvic floor relaxation/bulging training, diaphragmatic breathing, and urge delay strategies     Jean Claude participated in dynamic functional therapeutic activities to improve functional performance for 8  minutes, including:  -Pelvic floor check in during the day to ensure she is not clenching glutes/holding PFM tense.   -Breathing during sit to stand and squatting activities throughout her day. No breath holding.        Home Exercises Provided and Patient Education Provided     Education provided:   - general " anatomy/physiology of urinary/ bowel  system and benefits of treatment were discussed with the pt. Additionally, bladder irritants, anatomy/physiology of pelvic floor, diaphragmatic breathing, behavior modifications, and urge delay strategies were reviewed.     Discussed progression of plan of care with patient; educated pt in activity modification; reviewed HEP with pt. Pt demonstrated and verbalized understanding of all instruction and was provided with a handout of HEP (see Patient Instructions).    Written Home Exercises Provided: yes.  Exercises were reviewed and Jean Claude was able to demonstrate them prior to the end of the session.  eJan Claude demonstrated good  understanding of the education provided.     See EMR under Patient Instructions for exercises provided 3/22/2023.    Assessment     Pt returns for first session since evaluation in January. Pt has had difficulty getting in due to work schedule. Emphasized importance of consistent attendance to reach goals. She does note some improvement utilizing urge delay and double voiding strategies. Today pt received manual release of levator ani mm with + response. May benefit from a vaginal wand - will discuss next visit. Also released guarding in adductors. Pt instructed in a series of hip and lumbar mobility ex to improve overall movement in pelvis. She has met 3 STG set at eval so far.   Jean Claude Is progressing well towards her goals.   Pt prognosis is Good.     Pt will continue to benefit from skilled outpatient physical therapy to address the deficits listed in the problem list box on initial evaluation, provide pt/family education and to maximize pt's level of independence in the home and community environment.     Pt's spiritual, cultural and educational needs considered and pt agreeable to plan of care and goals.     Anticipated barriers to physical therapy: none    Goals:   Short Term Goals: 5 weeks (ongoing)  Pt to be edu pelvic muscle bracing and be able to  "consistently perform correctly and quickly to help decrease incontinence with cough/laugh/sneeze.  Pt to perform "the knack" prior to coughing, laughing or sneezing to decrease risk of incontinence.  Pt to report a decrease in pad usage to no more than 3 a day to demonstrate improving pelvic floor function needed for continence.(Met)  Pt to be able to perform a 5  second kegel x 10 reps to demonstrate improving strength and endurance needed for continence.  Pt to be able to delay the urge to urinate at least 3 minutes with a strong urge to urinate in order to make it to the bathroom without leaking. (Met)  Pt to report being able to sneeze without incontinence demonstrating improved pelvic floor strength and coordination to improve confidence in social situations  Pt to voice understanding of the role that diet plays on urinary urgency.  (met)                    Long Term Goals: 10 week  Pt to be discharged with home plan for carry over after discharge.    Pt to be able to perform a 10 second kegel x 10 reps to demonstrate improving strength and endurance needed for continence.  Pt to report a decrease in pad usage to 1 pad a day to demonstrate improving pelvic floor muscle controls as evidenced by decreased episodes of incontinence needed to improve confidence in social situations.  5t to be able to delay the urge to urinate at least 5 minutes with a strong urge to urinate in order to make it to the bathroom without leaking.  Pt to report a decrease in urinary frequency from every 90 minutes to no more than once every 2 hours to improve ability to participate in social activities.  Pt to report elimination of incontinence with ADLs to demonstrate improved pelvic floor muscle strength and coordination  Pt to report ability to have intercourse without urinary incontinence.  Pt to demonstrate an improved score in the FOTO Bladder survey  to at least 46% to demonstrate improving continence.    Pt to increase pelvic " floor and abdominal strength to at least 4+/5 to demonstrate improved strength needed for continence with ADLs.           Plan     Plan of care Certification: 3/22/2023 to 6/16/2023.     Outpatient Physical Therapy 1 time every other week for 6 visits to include the following interventions: therapeutic exercises, therapeutic activity, neuromuscular re-education, manual therapy, modalities PRN, patient/family education, and self care/home management       Cinthya Basilio, PT

## 2023-04-10 ENCOUNTER — TELEPHONE (OUTPATIENT)
Dept: PHARMACY | Facility: CLINIC | Age: 43
End: 2023-04-10
Payer: COMMERCIAL

## 2023-04-10 ENCOUNTER — LAB VISIT (OUTPATIENT)
Dept: LAB | Facility: HOSPITAL | Age: 43
End: 2023-04-10
Attending: INTERNAL MEDICINE
Payer: COMMERCIAL

## 2023-04-10 DIAGNOSIS — Z00.00 ROUTINE GENERAL MEDICAL EXAMINATION AT HEALTH CARE FACILITY: ICD-10-CM

## 2023-04-10 LAB
ALBUMIN SERPL BCP-MCNC: 3.6 G/DL (ref 3.5–5.2)
ALP SERPL-CCNC: 61 U/L (ref 55–135)
ALT SERPL W/O P-5'-P-CCNC: 26 U/L (ref 10–44)
ANION GAP SERPL CALC-SCNC: 9 MMOL/L (ref 8–16)
AST SERPL-CCNC: 22 U/L (ref 10–40)
BASOPHILS # BLD AUTO: 0.06 K/UL (ref 0–0.2)
BASOPHILS NFR BLD: 0.8 % (ref 0–1.9)
BILIRUB SERPL-MCNC: 0.3 MG/DL (ref 0.1–1)
BUN SERPL-MCNC: 14 MG/DL (ref 6–20)
CALCIUM SERPL-MCNC: 9.3 MG/DL (ref 8.7–10.5)
CHLORIDE SERPL-SCNC: 105 MMOL/L (ref 95–110)
CHOLEST SERPL-MCNC: 182 MG/DL (ref 120–199)
CHOLEST/HDLC SERPL: 3.9 {RATIO} (ref 2–5)
CO2 SERPL-SCNC: 27 MMOL/L (ref 23–29)
CREAT SERPL-MCNC: 1.2 MG/DL (ref 0.5–1.4)
DIFFERENTIAL METHOD: ABNORMAL
EOSINOPHIL # BLD AUTO: 0.2 K/UL (ref 0–0.5)
EOSINOPHIL NFR BLD: 3 % (ref 0–8)
ERYTHROCYTE [DISTWIDTH] IN BLOOD BY AUTOMATED COUNT: 13.2 % (ref 11.5–14.5)
EST. GFR  (NO RACE VARIABLE): 57.2 ML/MIN/1.73 M^2
ESTIMATED AVG GLUCOSE: 111 MG/DL (ref 68–131)
GLUCOSE SERPL-MCNC: 95 MG/DL (ref 70–110)
HBA1C MFR BLD: 5.5 % (ref 4–5.6)
HCT VFR BLD AUTO: 42.1 % (ref 37–48.5)
HDLC SERPL-MCNC: 47 MG/DL (ref 40–75)
HDLC SERPL: 25.8 % (ref 20–50)
HGB BLD-MCNC: 13.3 G/DL (ref 12–16)
IMM GRANULOCYTES # BLD AUTO: 0.03 K/UL (ref 0–0.04)
IMM GRANULOCYTES NFR BLD AUTO: 0.4 % (ref 0–0.5)
LDLC SERPL CALC-MCNC: 108.4 MG/DL (ref 63–159)
LYMPHOCYTES # BLD AUTO: 2.1 K/UL (ref 1–4.8)
LYMPHOCYTES NFR BLD: 27.6 % (ref 18–48)
MCH RBC QN AUTO: 26.8 PG (ref 27–31)
MCHC RBC AUTO-ENTMCNC: 31.6 G/DL (ref 32–36)
MCV RBC AUTO: 85 FL (ref 82–98)
MONOCYTES # BLD AUTO: 0.7 K/UL (ref 0.3–1)
MONOCYTES NFR BLD: 8.6 % (ref 4–15)
NEUTROPHILS # BLD AUTO: 4.6 K/UL (ref 1.8–7.7)
NEUTROPHILS NFR BLD: 59.6 % (ref 38–73)
NONHDLC SERPL-MCNC: 135 MG/DL
NRBC BLD-RTO: 0 /100 WBC
PLATELET # BLD AUTO: 294 K/UL (ref 150–450)
PMV BLD AUTO: 9.2 FL (ref 9.2–12.9)
POTASSIUM SERPL-SCNC: 3.9 MMOL/L (ref 3.5–5.1)
PROT SERPL-MCNC: 7.8 G/DL (ref 6–8.4)
RBC # BLD AUTO: 4.96 M/UL (ref 4–5.4)
SODIUM SERPL-SCNC: 141 MMOL/L (ref 136–145)
TRIGL SERPL-MCNC: 133 MG/DL (ref 30–150)
TSH SERPL DL<=0.005 MIU/L-ACNC: 1.5 UIU/ML (ref 0.4–4)
WBC # BLD AUTO: 7.76 K/UL (ref 3.9–12.7)

## 2023-04-10 PROCEDURE — 80053 COMPREHEN METABOLIC PANEL: CPT | Mod: PN | Performed by: FAMILY MEDICINE

## 2023-04-10 PROCEDURE — 36415 COLL VENOUS BLD VENIPUNCTURE: CPT | Mod: PN | Performed by: FAMILY MEDICINE

## 2023-04-10 PROCEDURE — 80061 LIPID PANEL: CPT | Performed by: FAMILY MEDICINE

## 2023-04-10 PROCEDURE — 83036 HEMOGLOBIN GLYCOSYLATED A1C: CPT | Performed by: FAMILY MEDICINE

## 2023-04-10 PROCEDURE — 85025 COMPLETE CBC W/AUTO DIFF WBC: CPT | Mod: PN | Performed by: FAMILY MEDICINE

## 2023-04-10 PROCEDURE — 84443 ASSAY THYROID STIM HORMONE: CPT | Performed by: FAMILY MEDICINE

## 2023-05-03 DIAGNOSIS — M54.2 CERVICALGIA: ICD-10-CM

## 2023-05-03 DIAGNOSIS — M54.12 CERVICAL RADICULOPATHY: ICD-10-CM

## 2023-05-04 RX ORDER — TIZANIDINE 4 MG/1
4 TABLET ORAL EVERY 12 HOURS PRN
Qty: 40 TABLET | Refills: 0 | OUTPATIENT
Start: 2023-05-04

## 2023-05-04 NOTE — TELEPHONE ENCOUNTER
I called Mrs. Dye to notify her that Linnea Ayala did not refill the requested medication from the [facility] and why, got voicemail, left call back number and the reason for the refusal.

## 2023-05-04 NOTE — TELEPHONE ENCOUNTER
Zanaflex refill not approved.  Last seen in June 2022.  She needs to see pcp or follow up in clinic to discuss symptoms further before refill will be approved.

## 2023-05-09 ENCOUNTER — PATIENT MESSAGE (OUTPATIENT)
Dept: UROLOGY | Facility: CLINIC | Age: 43
End: 2023-05-09
Payer: COMMERCIAL

## 2023-05-10 ENCOUNTER — TELEPHONE (OUTPATIENT)
Dept: PHARMACY | Facility: CLINIC | Age: 43
End: 2023-05-10
Payer: COMMERCIAL

## 2023-05-24 ENCOUNTER — DOCUMENTATION ONLY (OUTPATIENT)
Dept: REHABILITATION | Facility: HOSPITAL | Age: 43
End: 2023-05-24
Payer: COMMERCIAL

## 2023-05-24 ENCOUNTER — PATIENT MESSAGE (OUTPATIENT)
Dept: REHABILITATION | Facility: HOSPITAL | Age: 43
End: 2023-05-24

## 2023-05-24 NOTE — PROGRESS NOTES
Pt has cancelled or no showed last 3 visits. Has not been to PT since 3/22/23. Left message with pt in her portal that we will remove her from the schedule. She can call to schedule at a later date if she would like to continue.    Cinthya Basilio, PT

## 2023-06-08 ENCOUNTER — DOCUMENTATION ONLY (OUTPATIENT)
Dept: REHABILITATION | Facility: HOSPITAL | Age: 43
End: 2023-06-08
Payer: COMMERCIAL

## 2023-06-08 PROBLEM — M62.89 PELVIC FLOOR TENSION: Status: RESOLVED | Noted: 2023-01-13 | Resolved: 2023-06-08

## 2023-06-08 NOTE — PROGRESS NOTES
PHYSICAL THERAPY DISCHARGE:    This patient was originally evaluated at our facility on: 1/13/23         Pt attended PT for a total of 3 Visits receiving: Manual Therapy, Therex, Postural Education, Body Mechanics Training, Home Exercise Instruction     This patient's last visit occurred on: 3/22/23      Short term goals were achieved: 3 of 7    Long term goals were achieved: 0    Pt was DC'd from our care secondary to: Transportation problems       Therapist: Cinthya Basilio, PT  6/8/2023

## 2023-08-07 ENCOUNTER — PATIENT MESSAGE (OUTPATIENT)
Dept: UROLOGY | Facility: CLINIC | Age: 43
End: 2023-08-07
Payer: COMMERCIAL

## 2023-08-07 DIAGNOSIS — R30.0 DYSURIA: Primary | ICD-10-CM

## 2023-08-08 ENCOUNTER — LAB VISIT (OUTPATIENT)
Dept: LAB | Facility: HOSPITAL | Age: 43
End: 2023-08-08
Attending: FAMILY MEDICINE
Payer: COMMERCIAL

## 2023-08-08 DIAGNOSIS — R30.0 DYSURIA: ICD-10-CM

## 2023-08-08 LAB
BACTERIA #/AREA URNS HPF: NORMAL /HPF
MICROSCOPIC COMMENT: NORMAL
RBC #/AREA URNS HPF: 0 /HPF (ref 0–4)
SQUAMOUS #/AREA URNS HPF: 3 /HPF
WBC #/AREA URNS HPF: 2 /HPF (ref 0–5)

## 2023-08-08 PROCEDURE — 81000 URINALYSIS NONAUTO W/SCOPE: CPT | Mod: PN | Performed by: STUDENT IN AN ORGANIZED HEALTH CARE EDUCATION/TRAINING PROGRAM

## 2023-08-08 PROCEDURE — 87086 URINE CULTURE/COLONY COUNT: CPT | Performed by: STUDENT IN AN ORGANIZED HEALTH CARE EDUCATION/TRAINING PROGRAM

## 2023-08-10 ENCOUNTER — TELEPHONE (OUTPATIENT)
Dept: UROLOGY | Facility: CLINIC | Age: 43
End: 2023-08-10
Payer: COMMERCIAL

## 2023-08-10 LAB
BACTERIA UR CULT: NORMAL
BACTERIA UR CULT: NORMAL

## 2023-10-10 ENCOUNTER — TELEPHONE (OUTPATIENT)
Dept: HEMATOLOGY/ONCOLOGY | Facility: CLINIC | Age: 43
End: 2023-10-10
Payer: COMMERCIAL

## 2023-10-10 NOTE — NURSING
Received call from pt.  She would like to schedule f/u appt with Dr. Meehan here at [facility] to discuss surgery.  Scheduled for 10/18/23 at 4pm.  Asked her to call with any questions or concerns. She thanked me and verbalized understanding.

## 2023-10-18 ENCOUNTER — OFFICE VISIT (OUTPATIENT)
Dept: GYNECOLOGIC ONCOLOGY | Facility: CLINIC | Age: 43
End: 2023-10-18
Payer: COMMERCIAL

## 2023-10-18 ENCOUNTER — TELEPHONE (OUTPATIENT)
Dept: HEMATOLOGY/ONCOLOGY | Facility: CLINIC | Age: 43
End: 2023-10-18
Payer: COMMERCIAL

## 2023-10-18 VITALS
DIASTOLIC BLOOD PRESSURE: 95 MMHG | BODY MASS INDEX: 43.4 KG/M2 | HEART RATE: 104 BPM | WEIGHT: 293 LBS | SYSTOLIC BLOOD PRESSURE: 154 MMHG | TEMPERATURE: 98 F | OXYGEN SATURATION: 98 % | HEIGHT: 69 IN | RESPIRATION RATE: 16 BRPM

## 2023-10-18 DIAGNOSIS — Z87.42 HX OF OVARIAN CYST: ICD-10-CM

## 2023-10-18 DIAGNOSIS — N93.9 ABNORMAL VAGINAL BLEEDING: Primary | ICD-10-CM

## 2023-10-18 PROCEDURE — 3044F PR MOST RECENT HEMOGLOBIN A1C LEVEL <7.0%: ICD-10-PCS | Mod: CPTII,S$GLB,, | Performed by: OBSTETRICS & GYNECOLOGY

## 2023-10-18 PROCEDURE — 3077F PR MOST RECENT SYSTOLIC BLOOD PRESSURE >= 140 MM HG: ICD-10-PCS | Mod: CPTII,S$GLB,, | Performed by: OBSTETRICS & GYNECOLOGY

## 2023-10-18 PROCEDURE — 99999 PR PBB SHADOW E&M-EST. PATIENT-LVL III: ICD-10-PCS | Mod: PBBFAC,,, | Performed by: OBSTETRICS & GYNECOLOGY

## 2023-10-18 PROCEDURE — 4010F ACE/ARB THERAPY RXD/TAKEN: CPT | Mod: CPTII,S$GLB,, | Performed by: OBSTETRICS & GYNECOLOGY

## 2023-10-18 PROCEDURE — 1159F MED LIST DOCD IN RCRD: CPT | Mod: CPTII,S$GLB,, | Performed by: OBSTETRICS & GYNECOLOGY

## 2023-10-18 PROCEDURE — 3080F DIAST BP >= 90 MM HG: CPT | Mod: CPTII,S$GLB,, | Performed by: OBSTETRICS & GYNECOLOGY

## 2023-10-18 PROCEDURE — 4010F PR ACE/ARB THEARPY RXD/TAKEN: ICD-10-PCS | Mod: CPTII,S$GLB,, | Performed by: OBSTETRICS & GYNECOLOGY

## 2023-10-18 PROCEDURE — 3077F SYST BP >= 140 MM HG: CPT | Mod: CPTII,S$GLB,, | Performed by: OBSTETRICS & GYNECOLOGY

## 2023-10-18 PROCEDURE — 3080F PR MOST RECENT DIASTOLIC BLOOD PRESSURE >= 90 MM HG: ICD-10-PCS | Mod: CPTII,S$GLB,, | Performed by: OBSTETRICS & GYNECOLOGY

## 2023-10-18 PROCEDURE — 3008F BODY MASS INDEX DOCD: CPT | Mod: CPTII,S$GLB,, | Performed by: OBSTETRICS & GYNECOLOGY

## 2023-10-18 PROCEDURE — 99999 PR PBB SHADOW E&M-EST. PATIENT-LVL III: CPT | Mod: PBBFAC,,, | Performed by: OBSTETRICS & GYNECOLOGY

## 2023-10-18 PROCEDURE — 99214 PR OFFICE/OUTPT VISIT, EST, LEVL IV, 30-39 MIN: ICD-10-PCS | Mod: S$GLB,,, | Performed by: OBSTETRICS & GYNECOLOGY

## 2023-10-18 PROCEDURE — 3044F HG A1C LEVEL LT 7.0%: CPT | Mod: CPTII,S$GLB,, | Performed by: OBSTETRICS & GYNECOLOGY

## 2023-10-18 PROCEDURE — 99214 OFFICE O/P EST MOD 30 MIN: CPT | Mod: S$GLB,,, | Performed by: OBSTETRICS & GYNECOLOGY

## 2023-10-18 PROCEDURE — 1159F PR MEDICATION LIST DOCUMENTED IN MEDICAL RECORD: ICD-10-PCS | Mod: CPTII,S$GLB,, | Performed by: OBSTETRICS & GYNECOLOGY

## 2023-10-18 PROCEDURE — 3008F PR BODY MASS INDEX (BMI) DOCUMENTED: ICD-10-PCS | Mod: CPTII,S$GLB,, | Performed by: OBSTETRICS & GYNECOLOGY

## 2023-10-18 NOTE — NURSING
Met with patient in clinic today. Reviewed plan of care. Scheduled u/s for tomorrow afternoon.  Test instructions reviewed with pt.   Encouraged her to call with any questions or concerns.  She thanked me and verbalized understanding.

## 2023-10-19 ENCOUNTER — HOSPITAL ENCOUNTER (OUTPATIENT)
Dept: RADIOLOGY | Facility: HOSPITAL | Age: 43
Discharge: HOME | End: 2023-10-19
Attending: OBSTETRICS & GYNECOLOGY
Payer: COMMERCIAL

## 2023-10-19 DIAGNOSIS — Z87.42 HX OF OVARIAN CYST: ICD-10-CM

## 2023-10-19 DIAGNOSIS — N93.9 ABNORMAL VAGINAL BLEEDING: ICD-10-CM

## 2023-10-19 PROCEDURE — 76830 US PELVIS COMP WITH TRANSVAG NON-OB (XPD): ICD-10-PCS | Mod: 26,,, | Performed by: RADIOLOGY

## 2023-10-19 PROCEDURE — 76856 US EXAM PELVIC COMPLETE: CPT | Mod: TC,PO

## 2023-10-19 PROCEDURE — 76856 US EXAM PELVIC COMPLETE: CPT | Mod: 26,,, | Performed by: RADIOLOGY

## 2023-10-19 PROCEDURE — 76830 TRANSVAGINAL US NON-OB: CPT | Mod: 26,,, | Performed by: RADIOLOGY

## 2023-10-19 PROCEDURE — 76856 US PELVIS COMP WITH TRANSVAG NON-OB (XPD): ICD-10-PCS | Mod: 26,,, | Performed by: RADIOLOGY

## 2023-11-01 NOTE — PROGRESS NOTES
Subjective:      Chief Complaint: Follow-up, bilateral ovarian cysts and USI     Patient ID: Jean Claude Dye is a 43 y.o. female  Ab1 here for follow-up.  She was last seen 1 year ago with complaints of intermittent LQ pain and known ovarian cysts.  She was also complaining of USI and since then has seen Dr. Mercado who recommended pelvic floor physical therapy and oxybutynin.  She states that she has had minimal improvement with her incontinence and is continuing to have episodes of sharp shooting pain.  She has also had irregular bleeding for 2 months.  She would like to consider joint surgery.        Past Medical History:   Diagnosis Date    Hypertension       No past surgical history on file.   Family History   Problem Relation Age of Onset    Diabetes Mother     Hearing loss Maternal Grandmother     Hypertension Maternal Grandmother     Kidney disease Maternal Grandmother       Social History     Tobacco Use    Smoking status: Former     Current packs/day: 0.00     Average packs/day: 0.2 packs/day for 5.0 years (1.2 ttl pk-yrs)     Types: Cigarettes     Start date: 2008     Quit date: 2013     Years since quitting: 10.8     Passive exposure: Never    Smokeless tobacco: Never   Substance Use Topics    Alcohol use: Not Currently     Alcohol/week: 1.0 standard drink of alcohol     Types: 1 Glasses of wine per week     Comment: Occasionally    Drug use: Not Currently              Objective:        1. General: Well appearing, no apparent distress, alert and oriented.  2. Lymph: Neck symmetric without cervical or supraclavicular adenopathy or mass.  3. Heart:  Regular rate.  4. Lungs: Normal respiratory rate, no accessory muscle use.  5. Psych: Normal affect.  6. Abdomen:  Soft, NT.  7. Skin: Warm, dry, no rashes or lesions.   8. Extremities: Bilateral lower extremities without edema or tenderness.  9. Genitourinary               Pelvic Examination deferred to next visit at patient request.                  Assessment/Plan   Impression:  History of ovarian cysts with continued intermittent sharp LQ pain.    Onset of abnormal bleeding.  USI, per Dr. Mercado.    Plan:  We discussed the option for joint surgery pending her Urology work-up.  A repeat pelvic sono will be ordered to evaluate her ovarian cysts and recent onset of irregular bleeding.  She is encouraged to follow-up with Dr. Mercado and return for a repeat Pap smear.          Abnormal vaginal bleeding  -     US Pelvis Comp with Transvag NON-OB (xpd; Future; Expected date: 10/18/2023    Hx of ovarian cyst  -     US Pelvis Comp with Transvag NON-OB (xpd; Future; Expected date: 10/18/2023

## 2023-11-14 DIAGNOSIS — L73.9 FOLLICULITIS: Primary | ICD-10-CM

## 2023-11-14 RX ORDER — SULFAMETHOXAZOLE AND TRIMETHOPRIM 800; 160 MG/1; MG/1
1 TABLET ORAL 2 TIMES DAILY
Qty: 14 TABLET | Refills: 0 | Status: SHIPPED | OUTPATIENT
Start: 2023-11-14 | End: 2023-11-27

## 2024-02-07 ENCOUNTER — OFFICE VISIT (OUTPATIENT)
Dept: GYNECOLOGIC ONCOLOGY | Facility: CLINIC | Age: 44
End: 2024-02-07
Payer: COMMERCIAL

## 2024-02-07 VITALS
OXYGEN SATURATION: 98 % | WEIGHT: 293 LBS | HEART RATE: 76 BPM | RESPIRATION RATE: 16 BRPM | TEMPERATURE: 97 F | HEIGHT: 69 IN | BODY MASS INDEX: 43.4 KG/M2 | DIASTOLIC BLOOD PRESSURE: 76 MMHG | SYSTOLIC BLOOD PRESSURE: 128 MMHG

## 2024-02-07 DIAGNOSIS — N93.9 ABNORMAL VAGINAL BLEEDING: Primary | ICD-10-CM

## 2024-02-07 PROCEDURE — 99999 PR PBB SHADOW E&M-EST. PATIENT-LVL III: CPT | Mod: PBBFAC,,, | Performed by: OBSTETRICS & GYNECOLOGY

## 2024-02-07 PROCEDURE — 3008F BODY MASS INDEX DOCD: CPT | Mod: CPTII,S$GLB,, | Performed by: OBSTETRICS & GYNECOLOGY

## 2024-02-07 PROCEDURE — 3074F SYST BP LT 130 MM HG: CPT | Mod: CPTII,S$GLB,, | Performed by: OBSTETRICS & GYNECOLOGY

## 2024-02-07 PROCEDURE — 99214 OFFICE O/P EST MOD 30 MIN: CPT | Mod: S$GLB,,, | Performed by: OBSTETRICS & GYNECOLOGY

## 2024-02-07 PROCEDURE — 3078F DIAST BP <80 MM HG: CPT | Mod: CPTII,S$GLB,, | Performed by: OBSTETRICS & GYNECOLOGY

## 2024-02-07 PROCEDURE — 3044F HG A1C LEVEL LT 7.0%: CPT | Mod: CPTII,S$GLB,, | Performed by: OBSTETRICS & GYNECOLOGY

## 2024-02-29 PROBLEM — R93.89 THICKENED ENDOMETRIUM: Status: ACTIVE | Noted: 2024-02-29

## 2024-02-29 PROBLEM — N92.4 ABNORMAL PERIMENOPAUSAL BLEEDING: Status: ACTIVE | Noted: 2024-02-29

## 2024-03-04 ENCOUNTER — OFFICE VISIT (OUTPATIENT)
Dept: UROLOGY | Facility: CLINIC | Age: 44
End: 2024-03-04
Payer: COMMERCIAL

## 2024-03-04 VITALS — BODY MASS INDEX: 41.02 KG/M2 | WEIGHT: 293 LBS | HEIGHT: 71 IN

## 2024-03-04 DIAGNOSIS — Z09 FOLLOW-UP EXAM: Primary | ICD-10-CM

## 2024-03-04 DIAGNOSIS — N39.46 MIXED STRESS AND URGE URINARY INCONTINENCE: Primary | ICD-10-CM

## 2024-03-04 LAB
BILIRUBIN, UA POC OHS: NEGATIVE
BLOOD, UA POC OHS: ABNORMAL
CLARITY, UA POC OHS: CLEAR
COLOR, UA POC OHS: YELLOW
GLUCOSE, UA POC OHS: NEGATIVE
KETONES, UA POC OHS: NEGATIVE
LEUKOCYTES, UA POC OHS: NEGATIVE
NITRITE, UA POC OHS: NEGATIVE
PH, UA POC OHS: 7
POC RESIDUAL URINE VOLUME: 10 ML (ref 0–100)
PROTEIN, UA POC OHS: NEGATIVE
SPECIFIC GRAVITY, UA POC OHS: 1.01
UROBILINOGEN, UA POC OHS: 0.2

## 2024-03-04 PROCEDURE — 99204 OFFICE O/P NEW MOD 45 MIN: CPT | Mod: S$PBB,,, | Performed by: UROLOGY

## 2024-03-04 PROCEDURE — 81003 URINALYSIS AUTO W/O SCOPE: CPT | Mod: PBBFAC,PO | Performed by: UROLOGY

## 2024-03-04 PROCEDURE — 51798 US URINE CAPACITY MEASURE: CPT | Mod: PBBFAC,PO | Performed by: UROLOGY

## 2024-03-04 PROCEDURE — 99999 PR PBB SHADOW E&M-EST. PATIENT-LVL III: CPT | Mod: PBBFAC,,, | Performed by: UROLOGY

## 2024-03-04 PROCEDURE — 1159F MED LIST DOCD IN RCRD: CPT | Mod: CPTII,,, | Performed by: UROLOGY

## 2024-03-04 PROCEDURE — 3008F BODY MASS INDEX DOCD: CPT | Mod: CPTII,,, | Performed by: UROLOGY

## 2024-03-04 PROCEDURE — 3044F HG A1C LEVEL LT 7.0%: CPT | Mod: CPTII,,, | Performed by: UROLOGY

## 2024-03-04 RX ORDER — MIRABEGRON 50 MG/1
50 TABLET, EXTENDED RELEASE ORAL DAILY
Qty: 30 TABLET | Refills: 11 | Status: SHIPPED | OUTPATIENT
Start: 2024-03-04 | End: 2025-03-04

## 2024-03-04 NOTE — PROGRESS NOTES
DEBBIE MONGE called for cart side eval. See MAR for orders   [facility]      New Overactive Bladder (OAB) and Stress Urinary Incontinence Note    3/4/2024    Referred by:  No ref. provider found    HPI: Jean Claude Dye is a very pleasant 43 y.o. female who has not previously been seen by an [facility] specialist in our department referred for evaluation of stress urinary incontinence of several years duration and urgency urinary incontinence of several years duration.     She reports bother is associated with urinary daytime frequency ( 12-15 x daily), with nocturia (2-3x per night) and with urgency that results in urinary incontinence daily in urinary incontinence. She reports stress incontinence associated with coughing lifting laughing sneezing bending over transferring or other exertional activities. She reports urgency incontinence which is reported to be about equal to stress incontinence. She  requires daily pads.  She reports urinary incontinence is day and night but more predominant during the day. She has decreased overall fluid intake.       She notes that urgency incontinence is much more predominant when moving from seated to standing or getting out of bed.     She denies symptoms of irritative voiding including dysuria. She denies symptoms of obstructive voiding including decreased stream, hesitancy, intermittency, post void dribbling, and sense of incomplete emptying. Bladder scan PVR was 0 mL.  Her history includes no notation of urolithiasis, hematuria, prior pelvic surgery, previous prolapse or incontinence procedures or neurological symptoms/diagnoses. She denies all other prior pelvic surgeries or neurologic diagnoses. She denies a history of constipation. She denies a history suggestive of glaucoma.  She denies a history of hypertension.  She does not report symptoms suggestive of advanced POP.     Previous OAB therapies:  Behavioral Therapies  : (pelvic floor exercises) -  provided no benefit  Medications  oral oxybutynin ER 5 mg 1 months  "(severe dry eyes forced her to discontinue)  Other Therapies  No Other Therapies    Previous ADALGISA therapies:  pelvic floor exercises without therapy    A review of 10+ systems was conducted with pertinent positive and negative findings documented in HPI with all other systems reviewed and negative.    Past medical, family, surgical and social history reviewed as documented in chart with pertinent positive medical, family, surgical and social history detailed in HPI.    Exam Findings:    Deferred to VUDS Const: no acute distress, conversant and alert  Eyes: anicteric, extraocular muscles intact  ENMT: normocephalic, Nl oral membranes  Cardio: no cyanosis, nl cap refill  Pulm: no tachypnea; no resp distress  Abd: soft, no tenderness  Musc: no laceration, no tenderness  Neuro: alert; oriented x 3  Skin: warm, dry; no petichiae  Psych: no anxiety; normal speech     Assessment/Plan:    Overactive Bladder with Urgency Incontinence (new, addt'l workup): Her history is suggestive of Overactive Bladder (OAB) but it seems that she may also have a significant component of "stand-up Urgency" based on her reports today. Will try Myrbetriq, given severe dry eyes with oxybutynin. But if no response, would strongly consider treating the stress component.            Stress Urinary Incontinence (new, addt'l workup): She reports urinary incontinence suggestive of ADALGISA which the patient feels is bothersome enough to warrant further therapy. She believes ADALGISA and UUI are about equally bothersome. Her previous treatments for ADALGISA have included pelvic floor exercises without therapy. There are not reported symptoms of voiding difficulty. There is not a history suspicious for neurogenic bladder or voiding dysfunction.     Considering ADALGISA surgery for both ADALGISA and stand up urgency. Will plan on UDS and cystoscopy in office.   "

## 2024-04-11 ENCOUNTER — HOSPITAL ENCOUNTER (OUTPATIENT)
Dept: RADIOLOGY | Facility: HOSPITAL | Age: 44
Discharge: HOME | End: 2024-04-11
Attending: PHYSICIAN ASSISTANT
Payer: COMMERCIAL

## 2024-04-11 ENCOUNTER — OFFICE VISIT (OUTPATIENT)
Dept: PAIN MEDICINE | Facility: CLINIC | Age: 44
End: 2024-04-11
Payer: COMMERCIAL

## 2024-04-11 VITALS
BODY MASS INDEX: 46.57 KG/M2 | WEIGHT: 293 LBS | DIASTOLIC BLOOD PRESSURE: 99 MMHG | HEART RATE: 70 BPM | SYSTOLIC BLOOD PRESSURE: 182 MMHG

## 2024-04-11 DIAGNOSIS — M54.50 LUMBAR BACK PAIN: Primary | ICD-10-CM

## 2024-04-11 DIAGNOSIS — M54.50 LUMBAR BACK PAIN: ICD-10-CM

## 2024-04-11 PROCEDURE — 1160F RVW MEDS BY RX/DR IN RCRD: CPT | Mod: CPTII,S$GLB,, | Performed by: PHYSICIAN ASSISTANT

## 2024-04-11 PROCEDURE — 3044F HG A1C LEVEL LT 7.0%: CPT | Mod: CPTII,S$GLB,, | Performed by: PHYSICIAN ASSISTANT

## 2024-04-11 PROCEDURE — 72114 X-RAY EXAM L-S SPINE BENDING: CPT | Mod: TC,PO

## 2024-04-11 PROCEDURE — 3077F SYST BP >= 140 MM HG: CPT | Mod: CPTII,S$GLB,, | Performed by: PHYSICIAN ASSISTANT

## 2024-04-11 PROCEDURE — 99999 PR PBB SHADOW E&M-EST. PATIENT-LVL III: CPT | Mod: PBBFAC,,, | Performed by: PHYSICIAN ASSISTANT

## 2024-04-11 PROCEDURE — 1159F MED LIST DOCD IN RCRD: CPT | Mod: CPTII,S$GLB,, | Performed by: PHYSICIAN ASSISTANT

## 2024-04-11 PROCEDURE — 72114 X-RAY EXAM L-S SPINE BENDING: CPT | Mod: 26,,, | Performed by: RADIOLOGY

## 2024-04-11 PROCEDURE — 99213 OFFICE O/P EST LOW 20 MIN: CPT | Mod: S$GLB,,, | Performed by: PHYSICIAN ASSISTANT

## 2024-04-11 PROCEDURE — 3008F BODY MASS INDEX DOCD: CPT | Mod: CPTII,S$GLB,, | Performed by: PHYSICIAN ASSISTANT

## 2024-04-11 PROCEDURE — 3080F DIAST BP >= 90 MM HG: CPT | Mod: CPTII,S$GLB,, | Performed by: PHYSICIAN ASSISTANT

## 2024-04-12 NOTE — PROGRESS NOTES
[facility] New Patient Evaluation      Referred by: Self    PCP: Yuko Guo MD    CC:   Chief Complaint   Patient presents with    Low-back Pain          HPI:   Jean Claude Dye is a 43 y.o. year old female patient who has a past medical history of Hypertension and Sleep apnea. She presents in self referral for lower back pain.  She recalls many years ago treated for a disk hernia in the lower back with PT.  Current pain started about 5 years ago and has become progressively worse.  Pain is describes as a dull ache/ pain in the right lower lumbar region.  Sometimes with sitting long, she also feels pain/ numbness/ tingling in the right anterior leg to the foot.  Pain increases with bending and seems to improve with sitting short duratinos.  She has tried flexeril and currently takes advil as needed for pain.  She has not tried PT or any other treatments.    Denies bowel/ bladder incontinence.    Past and current medications:  Antineuropathics:  NSAIDs:  advil  Antidepressants:  Muscle relaxers:  flexeril  Opioids:  Antiplatelets/Anticoagulants:    Physical Therapy/ Chiropractic care:  none    Pain Intervention History:  none    Past Spine Surgical History:  none        History:    Current Outpatient Medications:     famotidine (PEPCID) 20 MG tablet, Take 1 tablet (20 mg total) by mouth 2 (two) times daily., Disp: 60 tablet, Rfl: 0    hydrocortisone (WESTCORT) 0.2 % cream, Apply to affected area 2 times daily (Patient taking differently: Apply to affeprn  cted area 2 times daily), Disp: 45 g, Rfl: 1    ibuprofen (ADVIL,MOTRIN) 200 MG tablet, Take 200 mg by mouth every 6 (six) hours as needed for Pain., Disp: , Rfl:     mirabegron (MYRBETRIQ) 50 mg Tb24, Take 1 tablet (50 mg total) by mouth once daily., Disp: 30 tablet, Rfl: 11    olmesartan-hydrochlorothiazide (BENICAR HCT) 40-25 mg per tablet, Take 1 tablet by mouth once daily., Disp: 90 tablet, Rfl: 3    Past Medical History:   Diagnosis  Date    Hypertension     Sleep apnea     cpap       Past Surgical History:   Procedure Laterality Date    HYSTEROSCOPY WITH DILATION AND CURETTAGE OF UTERUS N/A 2024    Procedure: HYSTEROSCOPY, WITH DILATION AND CURETTAGE OF UTERUS/ Myosure;  Surgeon: Maritza Meehan MD;  Location: [facility];  Service: Gynecology Oncology;  Laterality: N/A;       Family History   Problem Relation Age of Onset    Diabetes Mother     Hearing loss Maternal Grandmother     Hypertension Maternal Grandmother     Kidney disease Maternal Grandmother        Social History     Socioeconomic History    Marital status:    Tobacco Use    Smoking status: Former     Current packs/day: 0.00     Average packs/day: 0.2 packs/day for 5.0 years (1.2 ttl pk-yrs)     Types: Cigarettes     Start date: 2008     Quit date: 2013     Years since quittin.2     Passive exposure: Never    Smokeless tobacco: Never   Substance and Sexual Activity    Alcohol use: Not Currently     Alcohol/week: 1.0 standard drink of alcohol     Types: 1 Glasses of wine per week     Comment: Occasionally    Drug use: Not Currently    Sexual activity: Not Currently     Partners: Male     Birth control/protection: None   Social History Narrative    [employer]     2 children     Social Determinants of Health     Financial Resource Strain: Low Risk  (2024)    Overall Financial Resource Strain (CARDIA)     Difficulty of Paying Living Expenses: Not very hard   Food Insecurity: Patient Declined (2024)    Hunger Vital Sign     Worried About Running Out of Food in the Last Year: Patient declined     Ran Out of Food in the Last Year: Patient declined   Transportation Needs: Unknown (2024)    PRAPARE - Transportation     Lack of Transportation (Medical): Patient declined     Lack of Transportation (Non-Medical): No   Physical Activity: Inactive (2024)    Exercise Vital Sign     Days of Exercise per Week: 0 days     Minutes of  Exercise per Session: 0 min   Stress: Stress Concern Present (1/12/2024)    [employer] [place] [employer] - Occupational Stress Questionnaire     Feeling of Stress : To some extent   Social Connections: Unknown (1/12/2024)    Social Connection and Isolation Panel [NHANES]     Frequency of Communication with Friends and Family: More than three times a week     Frequency of Social Gatherings with Friends and Family: Patient declined     Active Member of Clubs or Organizations: Yes     Attends Club or Organization Meetings: Patient declined     Marital Status:    Housing Stability: Unknown (1/12/2024)    Housing Stability Vital Sign     Unable to Pay for Housing in the Last Year: No     Unstable Housing in the Last Year: No       Review of patient's allergies indicates:   Allergen Reactions    Lisinopril Other (See Comments)     cough       Labs:  Lab Results   Component Value Date    HGBA1C 5.5 02/27/2024       Lab Results   Component Value Date    WBC 8.49 02/27/2024    HGB 12.6 02/27/2024    HCT 40.4 02/27/2024    MCV 85 02/27/2024     02/27/2024           Review of Systems:  Right lower back pain.  Right leg pain..  Balance of review of systems is negative.    Physical Exam:  Vitals:    04/11/24 1537   BP: (!) 182/99   Pulse: 70   Weight: (!) 150.6 kg (332 lb 0.2 oz)   PainSc: 10-Worst pain ever   PainLoc: Back     Body mass index is 46.57 kg/m².    Pain disability index:      4/11/2024     3:38 PM   Last 3 PDI Scores   Pain Disability Index (PDI) 39       Gen: NAD  Psych: mood appropriate for given condition  HEENT: eyes anicteric   CV: RRR, 2+ radial pulse  Respiratory: non-labored, no signs of respiratory distress  Abd: non-distended  Skin: warm, dry and intact.  Gait: Able to heel walk, toe walk. No antalgic gait.     Coordination:   Tandem walking coordination: normal    Cervical spine: ROM is full in flexion, extension and lateral rotation without increased pain.  Spurling's maneuver  causes no neck pain to either side.  Myofascial exam: No Tenderness to palpation across cervical paraspinous region bilaterally.    Lumbar spine:  Lumbar spine: ROM is full with flexion extension and oblique extension with no increased pain.    Aime's test causes no increased pain on either side.    Supine straight leg raise is negative bilaterally.    Internal and external rotation of the hip causes no increased pain on either side.  Myofascial exam: No tenderness to palpation across lumbar paraspinous muscles. No tenderness to palpation over the bilateral greater trochanters and bilateral SI joint    Sensory:  Intact and symmetrical to light touch in C4-T1 dermatomes bilaterally. Intact and symmetrical to light touch in L1-S1 dermatomes bilaterally.    Motor:    Right Left   C4 Shoulder Abduction  5  5   C5 Elbow Flexion    5  5   C6 Wrist Extension  5  5   C7 Elbow Extension   5  5   C8/T1 Hand Intrinsics   5  5        Right Left   L2/3 Iliacus Hip flexion  5  5   L3/4 Qudratus Femoris Knee Extension  5  5   L4/5 Tib Anterior Ankle Dorsiflexion   5  5   L5/S1 Extensor Hallicus Longus Great toe extension  5  5   S1/S2 Gastroc/Soleus Plantar Flexion  5  5      Right Left   Triceps DTR 2+ 2+   Biceps DTR 2+ 2+   Brachioradialis DTR 2+ 2+   Patellar DTR 2+ 2+   Achilles DTR 2+ 2+   Durant Absent  Absent   Clonus Absent Absent   Babinski Absent Absent       Imaging:  No spine imaging available to review.       Assessment:   Jean Claude Dye is a 43 y.o. year old female patient who has a past medical history of Hypertension and Sleep apnea. She presents in self referral for right lower back dominant pain with less often and less bothersome right leg pain.  Pain pattern with likely strong myofascial, mechanical component as it is back dominant.  Pain influenced by weight.  Cannot completely rule out degenerative changes influencing pain.    Plan:  - recommend xray lumbar spine today to further assess alignment and  stability.  Will notify with results.  - to help with lumbar pain, recommend PT.  Particularly healthy back PT.  I think she will really benefit from it.  She would like to try.  - if no improvement consider further imaging.  - follow up in 8-10 weeks to monitor progress.    Problem List Items Addressed This Visit    None  Visit Diagnoses       Lumbar back pain    -  Primary    Relevant Orders    Ambulatory referral/consult to [facility]    X-Ray Lumbar Complete Including Flex And Ext (Completed)            Follow Up: RTC in 8-10 weeks    : Reviewed and consistent with medication use as prescribed.          Linnea Ayala PA-C  [facility]

## 2024-04-17 ENCOUNTER — TELEPHONE (OUTPATIENT)
Dept: PAIN MEDICINE | Facility: CLINIC | Age: 44
End: 2024-04-17
Payer: COMMERCIAL

## 2024-04-17 NOTE — TELEPHONE ENCOUNTER
----- Message from Linnea Ayala PA-C sent at 4/16/2024  3:04 PM CDT -----  Results Reviewed.  Please call with below:    Lower back xray results reviewed.  There is a mild right scoliosis, but otherwise good alignment of the bones.  There is no instability of the bones with bending and arching back movements.  There is some lower lumbar age appropriate degenerative change.    Proceed with healthy back PT as discussed in clinic.

## 2024-04-29 DIAGNOSIS — N92.4 ABNORMAL PERIMENOPAUSAL BLEEDING: ICD-10-CM

## 2024-04-29 DIAGNOSIS — Z87.42 HX OF OVARIAN CYST: ICD-10-CM

## 2024-04-29 DIAGNOSIS — N83.209 OVARIAN CYST: Primary | ICD-10-CM

## 2024-05-08 ENCOUNTER — CLINICAL SUPPORT (OUTPATIENT)
Dept: REHABILITATION | Facility: HOSPITAL | Age: 44
End: 2024-05-08
Payer: COMMERCIAL

## 2024-05-08 DIAGNOSIS — M54.50 LUMBAR BACK PAIN: ICD-10-CM

## 2024-05-08 DIAGNOSIS — M54.50 CHRONIC BILATERAL LOW BACK PAIN WITHOUT SCIATICA: Primary | ICD-10-CM

## 2024-05-08 DIAGNOSIS — R29.898 DECREASED STRENGTH OF TRUNK AND BACK: ICD-10-CM

## 2024-05-08 DIAGNOSIS — G89.29 CHRONIC BILATERAL LOW BACK PAIN WITHOUT SCIATICA: Primary | ICD-10-CM

## 2024-05-08 PROCEDURE — 97750 PHYSICAL PERFORMANCE TEST: CPT | Mod: 32,PO | Performed by: PHYSICAL THERAPIST

## 2024-05-08 NOTE — PLAN OF CARE
[facility]  Physical Therapy Lumbar Evaluation      Name: Jean Claude Dye  Clinic Number: [id number]    Therapy Diagnosis:   Encounter Diagnoses   Name Primary?    Lumbar back pain     Chronic bilateral low back pain without sciatica Yes    Decreased strength of trunk and back      Physician: Linnea Ayala PA-C    Physician Orders: PT Eval and Treat- [facility]  Medical Diagnosis from Referral: lumbar back pain  Evaluation Date: 5/8/2024  Authorization Period Expiration: 4/11/25  Plan of Care Expiration: 7/18/2024  Reassessment Due: 6/9/2024  Visit # / Visits authorized: 1/1  MedX testing visit 2    Time In: 4:00PM  Time Out: 5:00PM  Total Billable Time: 60 minutes  INSURANCE and OUTCOMES: Program Benefit Group with Lumbar Outcomes (Oswestry and AQoL) 1/3    Precautions: standard    Pattern of pain determined: Pattern 2    Subjective     Date of onset: 1 year  History of current condition: Jean Claude reports she has been having pain across low back for approximately 1 year with standing/walking activities. Pain resolves almost immediately with sitting. This has limited her with household chores. She has to bend over or sit to complete chores. She also has some right lateral hip pain with sleeping on right side. 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     Medical History:   Past Medical History:   Diagnosis Date    Hypertension     Sleep apnea     cpap       Surgical History:   Jean Claude Dye  has a past surgical history that includes Hysteroscopy with dilation and curettage of uterus (N/A, 2/29/2024).    Medications:   Jean Claude has a current medication list which includes the following prescription(s): famotidine, hydrocortisone, ibuprofen, mirabegron, and olmesartan-hydrochlorothiazide.    Allergies:   Review of patient's allergies indicates:   Allergen Reactions    Lisinopril Other (See Comments)     cough        Imaging:  X-ray 4/11/24  Vertebral bodies are normal in height without evidence of fracture.     Mild dextroconvex curvature of the lumbar spine.  Normal sagittal alignment is preserved.  No spondylolisthesis.  No abnormal translation with flexion and extension.     Intervertebral disc heights are well maintained.Lower lumbar facet arthropathy.    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, EVS part time 2x/week  [place]      Prior Level of Function: able to stand/walk without difficulty, no difficulty with household chores  Current Level of Function: difficulty with standing/ walking, difficulty completing household chores as she would like  DME owned/used: no  Gym Membership: no    Pain:  Current 1/10, worst 10/10, best 0/10   Location: bilateral back, worse on right   Description: Aching, Dull, and Burning  Aggravating Factors: Standing, Bending, and Walking  Easing Factors: hot bath/ shower, dual action tylenol/ibuprofen  Disturbed Sleep: no    Pattern of pain questions:  1.  Where is your pain the worst? Lowest part of low back  2.  Is your pain constant or intermittent? intermittent  3.  Does bending forward make your typical pain worse? yes  4.  Since the start of your back pain, has there been a change in your bowel or bladder? no  5.  What can't you do now that you use to be able to do? Change how she does housekeeping chores, power through pain, limit standing/walking    Pts goals: not to feel pain, move like she wants    Red Flag Screening:   Cough/Sneeze Strain: (--)  Bladder/Bowel: (--)  Falls: (--)  Night pain: (--)  Unexplained weight loss: (--)  General health: HTN    Objective      Postural examination/scapula alignment: Rounded shoulder, Head forward, and increased lumbar lordosis/anterior pelvic tilt  Joint integrity: normal  Skin integrity:WNL   Edema:  None  Correction of posture: better with lumbar roll- slimline  Sitting: flexed  Standing: as above    MOVEMENT LOSS - Lumbar   Norms ROM Loss Initial   Flexion Fingers touch toes, sacral angle >/= 70 deg, uniform spinal curvature, posterior weight shift  minimal loss, limited reversal of lordosis   Extension ASIS surpasses toes, spine of scapulae surpasses heels, uniform spinal curve within functional limits   Side glide Right  minimal loss   Side glide Left  minimal loss   Rotation Right PT observes contralateral shoulder minimal loss   Rotation Left PT observes contralateral shoulder minimal loss     Lower Extremity Strength  Right LE  Left LE    Hip flexion: 4-/5 Hip flexion: 4-/5   Hip extension:  4/5 Hip extension: 4/5   Hip abduction: 5/5 Hip abduction: 5/5   Hip adduction:  5/5 Hip adduction:  5/5   Hip External Rotation 4/5 Hip External Rotation 4/5   Hip Internal rotation   4/5 Hip Internal rotation 4/5   Knee Flexion 5/5 Knee Flexion 5/5   Knee Extension 5/5 Knee Extension 5/5   Ankle dorsiflexion: 5/5 Ankle dorsiflexion: 5/5   Ankle plantarflexion: 5/5 Ankle plantarflexion: 5/5     GAIT:  Assistive Device used: none  Level of Assistance: independent  Patient displays the following gait deviations: no gait deviations observed.     Special Tests:   Test Name  Test Result   Prone Instability Test (+)   SI Joint Provocation Test (--)   Straight Leg Raise (--)   Neural Tension Test (--)   Crossed Straight Leg Raise (--)   Walking on toes Able   Walking on heels  Able     NEUROLOGICAL SCREENING:     Sensory deficits: no    Reflexes:    Left Right   Patella Tendon 2+ 2+   Achilles Tendon 2+ 2+   Babinski  (--) (--)   Clonus (--) (--)     REPEATED TEST MOVEMENTS:    Baseline symptoms:  Repeated Flexion in Standing better   Repeated Extension in Standing worse   Repeated Flexion in lying better   Repeated Extension in lying  worse       STATIC TESTS and other movements:   Prone lie worse   Prone lie on elbows  "worse   Sitting slouched  no effect   Sitting erect better   Sustained flexion better     Lumbar testing Visit 2    OUTCOMES SELECTION:   Program Patient Outcome Measures    Oswestry Score:  11/50 = 22% disability     AQoL Score:  4/36 = 11% disability          Treatment     Total Treatment time separate from Evaluation: 40 minutes    Nikea received therapeutic exercises to develop/improve posture, lumbar ROM, strength, and muscular endurance for 15 minutes including the following exercises:     Supine legs on red ball, static rest 2 min  Both knees to chest red ball x20, 3-5 sec ea  PPT x20, 5"  Seated lumbar flexion 5x 5 sec    Written Home Exercises Provided: Patient instructed to cont prior HEP.    HEP AS FOLLOWS:  Rest with legs 90/90 2 min  Bilateral knees to chest with ball if available  PPT  Seated lumbar flexion    Exercises were reviewed and Jean Claude was able to demonstrate them prior to the end of the session. Jean Claude demonstrated good  understanding of the education provided.     See EMR under Patient Instructions for exercises provided 5/8/2024.    MedX Testing:  MedX testing to be performed next visit          5/8/2024     7:42 AM   [facility] Therapy   Visit Number 1   VAS Pain Rating 1   Flexion in Lying 20   Flexion in Sitting 5         Therapeutic Education/Activity provided for 25 minutes:   - Patient was given an [facility] Visit 1 handout which discusses the following:  - what to expect in therapy  - an overview of the program, including health coaching and wellness  - importance of spinal hygiene, proper posture, lifting mechanics, sleep quality, and nutrition/hydration   - Lee roll trialed, recommended, and purchase information was provided.  - Patient received a handout regarding anticipated muscular soreness following the isometric test and strategies for management were reviewed with patient including stretching, using ice and scheduled rest.   - Patient received verbal education " on the following:   - Healthy Back program   - purpose of the isometric test  - safe progression of lumbar strengthening, wellness approach, and systemic strengthening.   - safe usage of MedX machine and testing protocols.    Jean Claude received cold pack for 00 minutes to low back in [place].    Assessment     Jean Claude is a 43 y.o. female referred to [facility] with a medical diagnosis of lumbar back pain. Pt presents with low back pain for approximately 1 year with standing/walking. She presents with flexion preference. She has decreased abdominal and back strength with limited lumbar stabilization. She is untrained in proper body mechanics. She is hyperlordotic in posture. An active physical therapy program is recommended with the goal to restore function and reduce pain. A program of progressive resisted exercise, stretching, instruction in proper body mechanics, aerobic conditioning and a HEP will be included. Isometric back strength will be tested next visit.      Pain Pattern: Pattern 2       Pt prognosis is Excellent.     Pt will benefit from skilled outpatient Physical Therapy to address the deficits stated above and in the chart below, to provide pt/family education, and to maximize pt's level of independence. Based on the above history and physical examination an active physical therapy program is recommended.      Plan of care discussed with patient: Yes  Pt's spiritual, cultural and educational needs considered and patient is agreeable to the plan of care and goals as stated below:     Anticipated Barriers for therapy: none    PT Evaluation Completed? Yes    Medical necessity is demonstrated by the following problem list:    History  Co-morbidities and personal factors that may impact the plan of care [] LOW: no personal factors / co-morbidities  [x] MODERATE: 1-2 personal factors / co-morbidities  [] HIGH: 3+ personal factors / co-morbidities    Moderate / High Support Documentation:   Co-morbidities  affecting plan of care: BMI, sleep apnea, HTN    Personal Factors:   no deficits     Examination  Body Structures and Functions, activity limitations and participation restrictions that may impact the plan of care [] LOW: addressing 1-2 elements  [x] MODERATE: 3+ elements  [] HIGH: 4+ elements (please support below)    Moderate / High Support Documentation: See objective findings in evaluation above     Clinical Presentation [] LOW: stable  [x] MODERATE: Evolving  [] HIGH: Unstable     Decision Making/ Complexity Score: moderate         GOALS: Pt is in agreement with the following goals.    Short term goals:  6 weeks or 10 visits   - Pt will demonstrate increased lumbar MedX ROM by at least 3 degrees from the initial ROM value with improvements noted in functional ROM and ability to perform ADLs. Appropriate and Ongoing  - Pt will demonstrate increased MedX average isometric strength value by 20% from initial test resulting in improved ability to perform bending, lifting, and carrying activities safely, confidently. Appropriate and Ongoing  - Pt will report a reduction in worst pain score by 1-2 points for improved tolerance for standing/walking, household chores. Appropriate and Ongoing  - Pt able to perform HEP correctly with minimal cueing or supervision from therapist to encourage independent management of symptoms. Appropriate and Ongoing    Long term goals: 10 weeks or 20 visits   - Pt will demonstrate increased lumbar MedX ROM by at least 6 degrees from initial ROM value, resulting in improved ability to perform functional forward bending while standing and sitting. Appropriate and Ongoing  - Pt will demonstrate increased MedX average isometric strength value by 30% from initial test resulting in improved ability to perform bending, lifting, and carrying activities safely and confidently. Appropriate and Ongoing  - Pt to demonstrate ability to independently control and reduce their pain through posture  positioning and mechanical movements throughout a typical day. Appropriate and Ongoing  - Pt will demonstrate reduced pain and improved functional outcomes as reported on the Oswestry Disability Index by reaching a score of 16% or less in order to demonstrate subjective improvement in pt's condition. . Appropriate and Ongoing  - Pt will demonstrate independence with the HEP at discharge. Appropriate and Ongoing  - Pt will have 0/10 pain and be able to move like she wants to with ADLs(patient goal) Appropriate and Ongoing    Plan     Outpatient physical therapy 2x week for 10 weeks or 20 visits to include the following:   - Patient education  - Therapeutic exercise  - Manual therapy  - Performance testing   - Neuromuscular Re-education  - Therapeutic activity   - Modalities    Pt may be seen by PTA as part of the rehabilitation team.     Therapist: Luda Kaur, PT  5/9/2024

## 2024-05-08 NOTE — PATIENT INSTRUCTIONS
double knee to chest with swiss ball    Rolling ball towards chest until you feel a stretch in the low back.    Posterior pelvic tilt    -Start by lying supine on the table.  -Knees bent and feet flat down.  -Push pelvis into the table; like you're trying to crush someone's hand.  -Roll pelvis posteriorly.  Hold 3 seconds. Repeat 20x    Seated Lumbar Flexion    Sit on the edge of a chair or bench with your legs apart. Round your low back and reach to the ground in between your legs. Engage your abs like you are doing a crunch to help you round your low back. Return to an upright seated position and repeat. Hold 5 seconds. Repeat 5x.

## 2024-05-09 ENCOUNTER — HOSPITAL ENCOUNTER (OUTPATIENT)
Dept: RADIOLOGY | Facility: HOSPITAL | Age: 44
Discharge: HOME | End: 2024-05-09
Attending: OBSTETRICS & GYNECOLOGY
Payer: COMMERCIAL

## 2024-05-09 DIAGNOSIS — Z87.42 HX OF OVARIAN CYST: ICD-10-CM

## 2024-05-09 DIAGNOSIS — N92.4 ABNORMAL PERIMENOPAUSAL BLEEDING: ICD-10-CM

## 2024-05-09 PROBLEM — R29.898 DECREASED STRENGTH OF TRUNK AND BACK: Status: ACTIVE | Noted: 2024-05-09

## 2024-05-09 PROBLEM — M54.50 CHRONIC BILATERAL LOW BACK PAIN WITHOUT SCIATICA: Status: ACTIVE | Noted: 2024-05-09

## 2024-05-09 PROBLEM — G89.29 CHRONIC BILATERAL LOW BACK PAIN WITHOUT SCIATICA: Status: ACTIVE | Noted: 2024-05-09

## 2024-05-09 PROCEDURE — 76830 TRANSVAGINAL US NON-OB: CPT | Mod: TC,PO

## 2024-05-09 PROCEDURE — 76856 US EXAM PELVIC COMPLETE: CPT | Mod: 26,,, | Performed by: RADIOLOGY

## 2024-05-09 PROCEDURE — 76830 TRANSVAGINAL US NON-OB: CPT | Mod: 26,,, | Performed by: RADIOLOGY

## 2024-05-13 ENCOUNTER — TELEPHONE (OUTPATIENT)
Dept: REHABILITATION | Facility: HOSPITAL | Age: 44
End: 2024-05-13

## 2024-05-20 ENCOUNTER — PATIENT MESSAGE (OUTPATIENT)
Dept: REHABILITATION | Facility: HOSPITAL | Age: 44
End: 2024-05-20
Payer: COMMERCIAL

## 2024-05-27 ENCOUNTER — LAB VISIT (OUTPATIENT)
Dept: LAB | Facility: HOSPITAL | Age: 44
End: 2024-05-27
Attending: FAMILY MEDICINE
Payer: COMMERCIAL

## 2024-05-27 ENCOUNTER — CLINICAL SUPPORT (OUTPATIENT)
Dept: REHABILITATION | Facility: HOSPITAL | Age: 44
End: 2024-05-27
Payer: COMMERCIAL

## 2024-05-27 DIAGNOSIS — M54.50 CHRONIC BILATERAL LOW BACK PAIN WITHOUT SCIATICA: Primary | ICD-10-CM

## 2024-05-27 DIAGNOSIS — R29.898 DECREASED STRENGTH OF TRUNK AND BACK: ICD-10-CM

## 2024-05-27 DIAGNOSIS — G89.29 CHRONIC BILATERAL LOW BACK PAIN WITHOUT SCIATICA: Primary | ICD-10-CM

## 2024-05-27 DIAGNOSIS — Z00.00 ROUTINE GENERAL MEDICAL EXAMINATION AT HEALTH CARE FACILITY: ICD-10-CM

## 2024-05-27 LAB
ALBUMIN SERPL BCP-MCNC: 3.7 G/DL (ref 3.5–5.2)
ALP SERPL-CCNC: 62 U/L (ref 55–135)
ALT SERPL W/O P-5'-P-CCNC: 25 U/L (ref 10–44)
ANION GAP SERPL CALC-SCNC: 10 MMOL/L (ref 8–16)
AST SERPL-CCNC: 23 U/L (ref 10–40)
BASOPHILS # BLD AUTO: 0.06 K/UL (ref 0–0.2)
BASOPHILS NFR BLD: 0.6 % (ref 0–1.9)
BILIRUB SERPL-MCNC: 0.4 MG/DL (ref 0.1–1)
BUN SERPL-MCNC: 15 MG/DL (ref 6–20)
CALCIUM SERPL-MCNC: 9.5 MG/DL (ref 8.7–10.5)
CHLORIDE SERPL-SCNC: 105 MMOL/L (ref 95–110)
CHOLEST SERPL-MCNC: 174 MG/DL (ref 120–199)
CHOLEST/HDLC SERPL: 3.9 {RATIO} (ref 2–5)
CO2 SERPL-SCNC: 27 MMOL/L (ref 23–29)
CREAT SERPL-MCNC: 1.1 MG/DL (ref 0.5–1.4)
DIFFERENTIAL METHOD BLD: ABNORMAL
EOSINOPHIL # BLD AUTO: 0.2 K/UL (ref 0–0.5)
EOSINOPHIL NFR BLD: 1.8 % (ref 0–8)
ERYTHROCYTE [DISTWIDTH] IN BLOOD BY AUTOMATED COUNT: 13.3 % (ref 11.5–14.5)
EST. GFR  (NO RACE VARIABLE): >60 ML/MIN/1.73 M^2
GLUCOSE SERPL-MCNC: 99 MG/DL (ref 70–110)
HCT VFR BLD AUTO: 40.8 % (ref 37–48.5)
HDLC SERPL-MCNC: 45 MG/DL (ref 40–75)
HDLC SERPL: 25.9 % (ref 20–50)
HGB BLD-MCNC: 12.8 G/DL (ref 12–16)
IMM GRANULOCYTES # BLD AUTO: 0.08 K/UL (ref 0–0.04)
IMM GRANULOCYTES NFR BLD AUTO: 0.8 % (ref 0–0.5)
LDLC SERPL CALC-MCNC: 109.2 MG/DL (ref 63–159)
LYMPHOCYTES # BLD AUTO: 2.9 K/UL (ref 1–4.8)
LYMPHOCYTES NFR BLD: 29.2 % (ref 18–48)
MCH RBC QN AUTO: 26.6 PG (ref 27–31)
MCHC RBC AUTO-ENTMCNC: 31.4 G/DL (ref 32–36)
MCV RBC AUTO: 85 FL (ref 82–98)
MONOCYTES # BLD AUTO: 0.7 K/UL (ref 0.3–1)
MONOCYTES NFR BLD: 6.9 % (ref 4–15)
NEUTROPHILS # BLD AUTO: 6 K/UL (ref 1.8–7.7)
NEUTROPHILS NFR BLD: 60.7 % (ref 38–73)
NONHDLC SERPL-MCNC: 129 MG/DL
NRBC BLD-RTO: 0 /100 WBC
PLATELET # BLD AUTO: 315 K/UL (ref 150–450)
PMV BLD AUTO: 9.5 FL (ref 9.2–12.9)
POTASSIUM SERPL-SCNC: 4.2 MMOL/L (ref 3.5–5.1)
PROT SERPL-MCNC: 8 G/DL (ref 6–8.4)
RBC # BLD AUTO: 4.81 M/UL (ref 4–5.4)
SODIUM SERPL-SCNC: 142 MMOL/L (ref 136–145)
TRIGL SERPL-MCNC: 99 MG/DL (ref 30–150)
TSH SERPL DL<=0.005 MIU/L-ACNC: 1.13 UIU/ML (ref 0.4–4)
WBC # BLD AUTO: 9.93 K/UL (ref 3.9–12.7)

## 2024-05-27 PROCEDURE — 80061 LIPID PANEL: CPT

## 2024-05-27 PROCEDURE — 80053 COMPREHEN METABOLIC PANEL: CPT | Mod: PN

## 2024-05-27 PROCEDURE — 36415 COLL VENOUS BLD VENIPUNCTURE: CPT | Mod: PN

## 2024-05-27 PROCEDURE — 97750 PHYSICAL PERFORMANCE TEST: CPT | Mod: 32,PO | Performed by: PHYSICAL THERAPIST

## 2024-05-27 PROCEDURE — 85025 COMPLETE CBC W/AUTO DIFF WBC: CPT | Mod: PN

## 2024-05-27 PROCEDURE — 84443 ASSAY THYROID STIM HORMONE: CPT

## 2024-05-27 NOTE — PROGRESS NOTES
[facility]  Physical Therapy Treatment Note     Name: Jean Claude Dye  Clinic Number: [id number]    Therapy Diagnosis:   Encounter Diagnoses   Name Primary?    Chronic bilateral low back pain without sciatica Yes    Decreased strength of trunk and back      Physician: Linnea Ayala PA-C    Visit Date: 5/27/2024    Physician Orders: PT Eval and Treat- Healthy Back  Medical Diagnosis from Referral: lumbar back pain  Evaluation Date: 5/8/2024  Authorization Period Expiration: 12/31/24  Plan of Care Expiration: 7/18/2024  Reassessment Due: 6/9/2024  Visit # / Visits authorized: 1/20 (+1 prior auth)  MedX testing visit 2    PTA Visit #: 0/5     Time In: 10:55AM  Time Out: 12:00PM  Total Billable Time: 55 minutes  INSURANCE and OUTCOMES: Program Benefit Group with Lumbar Outcomes (Oswestry and AQoL) 1/3    Precautions: standard    Pattern of pain determined: Pattern 2    Subjective     Jean Claude Dye reports improvement of symptoms. She reports decreased pain with stretching. She also walked over here from [facility] without pain.      Patient reports tolerating previous visit   without adverse effect.  Patient reports their pain to be 0/10 on a 0-10 scale with 0 being no pain and 10 being the worst pain imaginable.  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[employer], [employer] part time 2x/week  [place]      Pt goals:  not to feel pain, move like she wants    Objective      Lumbar  Isometric Testing on Med X equipment: Testing administered by PT    Test Initial Baseline Midpoint Final   Date 5/27/24     ROM 0-33 deg     Max Peak Torque 221      Min Peak Torque 92      Flex/Ext Ratio 2.4:1     % variance  normative data -9%     % change from initial test N/A visit 1             Outcomes:  Intake Score: 22% Oswestry, AQoL 11%  Visit 6 Score:   Visit 10 Score:   Discharge  "Score:  Goal Score: 16%           Treatment     Jean Claude received the treatments listed below:      Medical MedX Treatment as follows:  MedX testing performed day 2: Patient  received neuromuscular education to engage spinal musculature correctly for motor control and engagement of musculature for 15 minutes including the MedX exercise component and practice and standard testing. MedX dynamic exercise and baseline isometric test performed with instructions to guide the patient safely through the testing procedure. Patient instructed to perform isometric test correctly and safely while building to an optimal force with a pain-free effort. Patient also instructed that they should feel support/pressure from MedX restraints but no pain/discomfort, and encouraged to report any pain to therapist. Patient demonstrated appropriate understanding of information and tolerance of test.  Education regarding purpose of test, safety during test given, and reviewed possible more soreness and strategies.         Jean Claude participated in neuromuscular re-education activities to improve balance, coordination, proprioception, motor control and/or posture for 10 minutes. The following activities were included:    Both knees to chest red ball x20, 3-5 sec ea  PPT x20, 5"  Lumbar stabilization march in place x20       Jean Claude participated in therapeutic exercises to develop strength, endurance, ROM, flexibility, posture, and core stabilization for 30 minutes including:  Supine legs on red ball, static rest 2 min  Seated lumbar flexion 5x 5 sec       Peripheral muscle strengthening which included one set of 15-20 repetitions at a slow and controlled 10-13 second per rep pace focused on strengthening supporting musculature in order to improve body mechanics and functional mobility. Patient and therapist focused on proper form during treatment to ensure optimal strengthening of each targeted muscle group.  Machines utilized included:Torso rotation, " Leg Ext, Leg Curl, Chest Press, Rowing, Hip Abd, Hip Add, and Leg Press  To be added next visit:Triceps and Biceps      Jean Claude participated in dynamic functional therapeutic activities to improve functional performance and simulate household and community activities for 00  minutes. The following activities were included:      Jean Claude received manual therapy techniques for 00  minutes. The following activities were included:      Pt given cold pack for 10 minutes to low back in [place].    Patient Education and Home Exercises     Home exercises include:  Rest with legs 90/90 2 min  Bilateral knees to chest with ball if available  PPT  Seated lumbar flexion    Cardio program (V5): -  Lifting education (V11): -  Posture/Lumbar roll: instruct visit 1  Fridge Magnet Discharge handout (date given): -  Equipment at home/gym membership: no    Education provided:   - PT role and POC  - HEP      Written Home Exercises Provided: Patient instructed to cont prior HEP.  Exercises were reviewed and Jean Claude was able to demonstrate them prior to the end of the session.  Jean Claude demonstrated good  understanding of the education provided.     See EMR under Patient Instructions for exercises provided prior visit.    Assessment     Jean Claude presents to second healthy back visit reporting decreased pain, was able to demo HEP with Min VC for form. Pt was able to tolerate Medical MedX machine well as follows:  MedX testing performed and patient tolerated test well. Her lumbar strength is 9% below normative data with isometric testing on MEDX. Pt was also able to complete half of the peripheral strengthening exercises without increased discomfort and will complete the complete circuit next visit as tolerated.    Patient is making good progress towards established goals.  Pt will continue to benefit from skilled outpatient physical therapy to address the deficits stated in the impairment chart, provide pt/family education and to maximize pt's level of  independence in the home and community environment.     Anticipated Barriers for therapy: none  Pt's spiritual, cultural and educational needs considered and pt agreeable to plan of care and goals as stated below:     Goals:   Short term goals:  6 weeks or 10 visits   - Pt will demonstrate increased lumbar MedX ROM by at least 3 degrees from the initial ROM value with improvements noted in functional ROM and ability to perform ADLs. Appropriate and Ongoing  - Pt will demonstrate increased MedX average isometric strength value by 20% from initial test resulting in improved ability to perform bending, lifting, and carrying activities safely, confidently. Appropriate and Ongoing  - Pt will report a reduction in worst pain score by 1-2 points for improved tolerance for standing/walking, household chores. Appropriate and Ongoing  - Pt able to perform HEP correctly with minimal cueing or supervision from therapist to encourage independent management of symptoms. Appropriate and Ongoing     Long term goals: 10 weeks or 20 visits   - Pt will demonstrate increased lumbar MedX ROM by at least 6 degrees from initial ROM value, resulting in improved ability to perform functional forward bending while standing and sitting. Appropriate and Ongoing  - Pt will demonstrate increased MedX average isometric strength value by 30% from initial test resulting in improved ability to perform bending, lifting, and carrying activities safely and confidently. Appropriate and Ongoing  - Pt to demonstrate ability to independently control and reduce their pain through posture positioning and mechanical movements throughout a typical day. Appropriate and Ongoing  - Pt will demonstrate reduced pain and improved functional outcomes as reported on the Oswestry Disability Index by reaching a score of 16% or less in order to demonstrate subjective improvement in pt's condition. . Appropriate and Ongoing  - Pt will demonstrate independence with the HEP  at discharge. Appropriate and Ongoing  - Pt will have 0/10 pain and be able to move like she wants to with ADLs(patient goal) Appropriate and Ongoing      Plan     Continue with established Plan of Care towards established PT goals.     Therapist: Luda Kaur, PT  5/27/2024

## 2024-05-29 ENCOUNTER — CLINICAL SUPPORT (OUTPATIENT)
Dept: REHABILITATION | Facility: HOSPITAL | Age: 44
End: 2024-05-29
Payer: COMMERCIAL

## 2024-05-29 DIAGNOSIS — R29.898 DECREASED STRENGTH OF TRUNK AND BACK: ICD-10-CM

## 2024-05-29 DIAGNOSIS — M54.50 CHRONIC BILATERAL LOW BACK PAIN WITHOUT SCIATICA: Primary | ICD-10-CM

## 2024-05-29 DIAGNOSIS — G89.29 CHRONIC BILATERAL LOW BACK PAIN WITHOUT SCIATICA: Primary | ICD-10-CM

## 2024-05-29 PROCEDURE — 97750 PHYSICAL PERFORMANCE TEST: CPT | Mod: 32,PO | Performed by: PHYSICAL THERAPIST

## 2024-05-29 NOTE — PROGRESS NOTES
[facility]  Physical Therapy Treatment Note     Name: Jean Claude Dye  Clinic Number: [id number]    Therapy Diagnosis:   Encounter Diagnoses   Name Primary?    Chronic bilateral low back pain without sciatica Yes    Decreased strength of trunk and back      Physician: Linnea Ayala PA-C    Visit Date: 5/29/2024    Physician Orders: PT Eval and Treat- Healthy Back  Medical Diagnosis from Referral: lumbar back pain  Evaluation Date: 5/8/2024  Authorization Period Expiration: 12/31/24  Plan of Care Expiration: 7/18/2024  Reassessment Due: 6/9/2024  Visit # / Visits authorized: 2/20 (+1 prior auth)  MedX testing visit 2    PTA Visit #: 0/5     Time In: 3:00PM  Time Out: 4:10PM  Total Billable Time: 60 minutes  INSURANCE and OUTCOMES: Program Benefit Group with Lumbar Outcomes (Oswestry and AQoL) 1/3    Precautions: standard    Pattern of pain determined: Pattern 2    Subjective     Jean Claude Dye reports soreness following testing last visit. Minimal pain during work day today. No complaints of pain currently.    Patient reports tolerating previous visit   without adverse effect.  Patient reports their pain to be 0/10 on a 0-10 scale with 0 being no pain and 10 being the worst pain imaginable.  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[place]      Pt goals:  not to feel pain, move like she wants    Objective      Lumbar  Isometric Testing on Med X equipment: Testing administered by PT    Test Initial Baseline Midpoint Final   Date 5/27/24     ROM 0-33 deg     Max Peak Torque 221      Min Peak Torque 92      Flex/Ext Ratio 2.4:1     % variance  normative data -9%     % change from initial test N/A visit 1             Outcomes:  Intake Score: 22% Oswestry, AQoL 11%  Visit 6 Score:   Visit 10 Score:   Discharge Score:  Goal Score: 16%  "          Treatment     Jean Claude received the treatments listed below:      Medical MedX Treatment as follows:    MedX exercise started day 3:  Patient received neuromuscular education for 15  minutes via participation on the Medical MedX Machine. Therapist assisted patient in isolating and engaging spinal stabilization musculature in order to improve functional ability and postural control. Patient performed exercise with therapist guidance in order to accurately use pacer function, avoid valsalva, and optimally exert effort within a safe and effective range via the Arya Exertion Rating Scale. Patient instructed to perform at a midrange of exertion and to complete 15-20 repetitions within appropriate split time, with proper technique, and while maintaining safety.           Jean Claude participated in neuromuscular re-education activities to improve balance, coordination, proprioception, motor control and/or posture for 10 minutes. The following activities were included:    Both knees to chest red ball x20, 3-5 sec ea  PPT x20, 5"  Lumbar stabilization march in place x20       Jean Claude participated in therapeutic exercises to develop strength, endurance, ROM, flexibility, posture, and core stabilization for 35 minutes including:  Supine legs on red ball, static rest 2 min  Seated lumbar flexion 5x 5 sec       Peripheral muscle strengthening which included one set of 15-20 repetitions at a slow and controlled 10-13 second per rep pace focused on strengthening supporting musculature in order to improve body mechanics and functional mobility. Patient and therapist focused on proper form during treatment to ensure optimal strengthening of each targeted muscle group.  Machines utilized included:Torso rotation, Leg Ext, Leg Curl, Chest Press, Rowing, Hip Abd, Hip Add, and Leg Press  To be added next visit:Triceps and Biceps      Jean Claude participated in dynamic functional therapeutic activities to improve functional performance and " simulate household and community activities for 00  minutes. The following activities were included:      Jean Claude received manual therapy techniques for 00  minutes. The following activities were included:      Pt given cold pack for 10 minutes to low back in [facility]-lie.    Patient Education and Home Exercises     Home exercises include:  Rest with legs 90/90 2 min  Bilateral knees to chest with ball if available  PPT  Seated lumbar flexion    Cardio program (V5): -  Lifting education (V11): -  Posture/Lumbar roll: instruct visit 1  Fridge Magnet Discharge handout (date given): -  Equipment at home/gym membership: no    Education provided:   - PT role and POC  - HEP      Written Home Exercises Provided: Patient instructed to cont prior HEP.  Exercises were reviewed and Jean Claude was able to demonstrate them prior to the end of the session.  Jean Claude demonstrated good  understanding of the education provided.     See EMR under Patient Instructions for exercises provided prior visit.    Assessment     Initiated neuromuscular reeducation on mEDX today at less than 50% of max effort with testing because of peak torque outlier. She tolerated 15 reps with appropriate perceived exertion and muscle fatigue. She progressed with resistance on most core and peripheral machines with good tolerance.    Patient is making good progress towards established goals.  Pt will continue to benefit from skilled outpatient physical therapy to address the deficits stated in the impairment chart, provide pt/family education and to maximize pt's level of independence in the home and community environment.     Anticipated Barriers for therapy: none  Pt's spiritual, cultural and educational needs considered and pt agreeable to plan of care and goals as stated below:     Goals:   Short term goals:  6 weeks or 10 visits   - Pt will demonstrate increased lumbar MedX ROM by at least 3 degrees from the initial ROM value with improvements noted in functional ROM  and ability to perform ADLs. Appropriate and Ongoing  - Pt will demonstrate increased MedX average isometric strength value by 20% from initial test resulting in improved ability to perform bending, lifting, and carrying activities safely, confidently. Appropriate and Ongoing  - Pt will report a reduction in worst pain score by 1-2 points for improved tolerance for standing/walking, household chores. Appropriate and Ongoing  - Pt able to perform HEP correctly with minimal cueing or supervision from therapist to encourage independent management of symptoms. Appropriate and Ongoing     Long term goals: 10 weeks or 20 visits   - Pt will demonstrate increased lumbar MedX ROM by at least 6 degrees from initial ROM value, resulting in improved ability to perform functional forward bending while standing and sitting. Appropriate and Ongoing  - Pt will demonstrate increased MedX average isometric strength value by 30% from initial test resulting in improved ability to perform bending, lifting, and carrying activities safely and confidently. Appropriate and Ongoing  - Pt to demonstrate ability to independently control and reduce their pain through posture positioning and mechanical movements throughout a typical day. Appropriate and Ongoing  - Pt will demonstrate reduced pain and improved functional outcomes as reported on the Oswestry Disability Index by reaching a score of 16% or less in order to demonstrate subjective improvement in pt's condition. . Appropriate and Ongoing  - Pt will demonstrate independence with the HEP at discharge. Appropriate and Ongoing  - Pt will have 0/10 pain and be able to move like she wants to with ADLs(patient goal) Appropriate and Ongoing      Plan     Continue with established Plan of Care towards established PT goals.     Therapist: Luda Kaur, PT  5/29/2024

## 2024-06-03 ENCOUNTER — CLINICAL SUPPORT (OUTPATIENT)
Dept: REHABILITATION | Facility: HOSPITAL | Age: 44
End: 2024-06-03
Payer: COMMERCIAL

## 2024-06-03 DIAGNOSIS — G89.29 CHRONIC BILATERAL LOW BACK PAIN WITHOUT SCIATICA: Primary | ICD-10-CM

## 2024-06-03 DIAGNOSIS — M54.50 CHRONIC BILATERAL LOW BACK PAIN WITHOUT SCIATICA: Primary | ICD-10-CM

## 2024-06-03 DIAGNOSIS — R29.898 DECREASED STRENGTH OF TRUNK AND BACK: ICD-10-CM

## 2024-06-03 PROCEDURE — 97750 PHYSICAL PERFORMANCE TEST: CPT | Mod: 32,PO | Performed by: PHYSICAL THERAPIST

## 2024-06-03 NOTE — PROGRESS NOTES
[facility]  Physical Therapy Treatment Note     Name: Jean Claude Dye  Clinic Number: [id number]    Therapy Diagnosis:   Encounter Diagnoses   Name Primary?    Chronic bilateral low back pain without sciatica Yes    Decreased strength of trunk and back      Physician: Linnea Ayala PA-C    Visit Date: 6/3/2024    Physician Orders: PT Eval and Treat- Healthy Back  Medical Diagnosis from Referral: lumbar back pain  Evaluation Date: 5/8/2024  Authorization Period Expiration: 12/31/24  Plan of Care Expiration: 7/18/2024  Reassessment Due: 6/9/2024  Visit # / Visits authorized: 3/20 (+1 prior auth)  MedX testing visit 2    PTA Visit #: 0/5     Time In: 10:55AM  Time Out: 11:58AM  Total Billable Time: 53 minutes  INSURANCE and OUTCOMES: Program Benefit Group with Lumbar Outcomes (Oswestry and AQoL) 1/3    Precautions: standard    Pattern of pain determined: Pattern 2    Subjective     Jean Claude Dye reports mild low back pain after lifting grandmother off floor this weekend and with [facility] today.    Patient reports tolerating previous visit   without adverse effect.  Patient reports their pain to be 2/10 on a 0-10 scale with 0 being no pain and 10 being the worst pain imaginable.  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[place]      Pt goals:  not to feel pain, move like she wants    Objective      Lumbar  Isometric Testing on Med X equipment: Testing administered by PT    Test Initial Baseline Midpoint Final   Date 5/27/24     ROM 0-33 deg     Max Peak Torque 221      Min Peak Torque 92      Flex/Ext Ratio 2.4:1     % variance  normative data -9%     % change from initial test N/A visit 1             Outcomes:  Intake Score: 22% Oswestry, AQoL 11%  Visit 6 Score:   Visit 10 Score:   Discharge Score:  Goal  "Score: 16%           Treatment     Jean Claude received the treatments listed below:      Medical MedX Treatment as follows:    MedX exercise started day 3:  Patient received neuromuscular education for 15  minutes via participation on the Medical MedX Machine. Therapist assisted patient in isolating and engaging spinal stabilization musculature in order to improve functional ability and postural control. Patient performed exercise with therapist guidance in order to accurately use pacer function, avoid valsalva, and optimally exert effort within a safe and effective range via the Arya Exertion Rating Scale. Patient instructed to perform at a midrange of exertion and to complete 15-20 repetitions within appropriate split time, with proper technique, and while maintaining safety.           Jean Claude participated in neuromuscular re-education activities to improve balance, coordination, proprioception, motor control and/or posture for 10 minutes. The following activities were included:    Both knees to chest red ball x20, 3-5 sec ea  PPT x20, 5"  Lumbar stabilization march in place x20  Lumbar stabilization leg extension 2/10       Jean Claude participated in therapeutic exercises to develop strength, endurance, ROM, flexibility, posture, and core stabilization for 28 minutes including:  Supine legs on red ball, static rest 2 min  Seated lumbar flexion 5x 5 sec       Peripheral muscle strengthening which included one set of 15-20 repetitions at a slow and controlled 10-13 second per rep pace focused on strengthening supporting musculature in order to improve body mechanics and functional mobility. Patient and therapist focused on proper form during treatment to ensure optimal strengthening of each targeted muscle group.  Machines utilized included:Torso rotation, Leg Ext, Leg Curl, Chest Press, Rowing, Hip Abd, Hip Add, and Leg Press,Triceps and Biceps      6/3/2024    11:30 AM   [facility]   Visit Number [facility]   VAS Pain Rating " 2   Recumbent Bike Seat Pos. 14   Time 10   Flexion in Lying 20   Flexion in Sitting 10   Lumbar Weight 80 lbs   Repetitions 15   Rating of Perceived Exertion 3   Ice - Z Lie (in min.) 10         Jean Claude participated in dynamic functional therapeutic activities to improve functional performance and simulate household and community activities for 00  minutes. The following activities were included:      Jean Claude received manual therapy techniques for 00  minutes. The following activities were included:      Pt given cold pack for 10 minutes to low back in Z-lie.    Patient Education and Home Exercises     Home exercises include:  Rest with legs 90/90 2 min  Bilateral knees to chest with ball if available  PPT  Seated lumbar flexion  Lumbar stabilization march in place  Lumbar stabilization leg extension    Cardio program (V5): -  Lifting education (V11): -  Posture/Lumbar roll: instruct visit 1  [employer] Discharge handout (date given): -  Equipment at home/gym membership: no    Education provided:   - PT role and POC  - HEP      Written Home Exercises Provided: Patient instructed to cont prior HEP.  Exercises were reviewed and Jean Claude was able to demonstrate them prior to the end of the session.  Jean Claude demonstrated good  understanding of the education provided.     See EMR under Patient Instructions for exercises provided prior visit.    Assessment     Pt demonstrating improved lumbar stabilization with exercise, progressing today with leg extension stabilization. Tolerated 15 reps on MEDX at 80 ft-lbs and likely would have tolerated 20, but mistakenly left her at warm up weight for 15 reps, so did not want her to fatigue. She tolerated the 80 ft-lbs at 3/10 perceived exertion anyway. Progressing well with peripheral machines with a few still being easy that will progress next visit.    Patient is making good progress towards established goals.  Pt will continue to benefit from skilled outpatient physical therapy to  address the deficits stated in the impairment chart, provide pt/family education and to maximize pt's level of independence in the home and community environment.     Anticipated Barriers for therapy: none  Pt's spiritual, cultural and educational needs considered and pt agreeable to plan of care and goals as stated below:     Goals:   Short term goals:  6 weeks or 10 visits   - Pt will demonstrate increased lumbar MedX ROM by at least 3 degrees from the initial ROM value with improvements noted in functional ROM and ability to perform ADLs. Appropriate and Ongoing  - Pt will demonstrate increased MedX average isometric strength value by 20% from initial test resulting in improved ability to perform bending, lifting, and carrying activities safely, confidently. Appropriate and Ongoing  - Pt will report a reduction in worst pain score by 1-2 points for improved tolerance for standing/walking, household chores. Appropriate and Ongoing  - Pt able to perform HEP correctly with minimal cueing or supervision from therapist to encourage independent management of symptoms. Appropriate and Ongoing     Long term goals: 10 weeks or 20 visits   - Pt will demonstrate increased lumbar MedX ROM by at least 6 degrees from initial ROM value, resulting in improved ability to perform functional forward bending while standing and sitting. Appropriate and Ongoing  - Pt will demonstrate increased MedX average isometric strength value by 30% from initial test resulting in improved ability to perform bending, lifting, and carrying activities safely and confidently. Appropriate and Ongoing  - Pt to demonstrate ability to independently control and reduce their pain through posture positioning and mechanical movements throughout a typical day. Appropriate and Ongoing  - Pt will demonstrate reduced pain and improved functional outcomes as reported on the Oswestry Disability Index by reaching a score of 16% or less in order to demonstrate  subjective improvement in pt's condition. . Appropriate and Ongoing  - Pt will demonstrate independence with the HEP at discharge. Appropriate and Ongoing  - Pt will have 0/10 pain and be able to move like she wants to with ADLs(patient goal) Appropriate and Ongoing      Plan     Continue with established Plan of Care towards established PT goals.     Therapist: Luda Kaur, PT  6/3/2024

## 2024-06-10 ENCOUNTER — CLINICAL SUPPORT (OUTPATIENT)
Dept: REHABILITATION | Facility: HOSPITAL | Age: 44
End: 2024-06-10
Payer: COMMERCIAL

## 2024-06-10 DIAGNOSIS — M54.50 CHRONIC BILATERAL LOW BACK PAIN WITHOUT SCIATICA: Primary | ICD-10-CM

## 2024-06-10 DIAGNOSIS — R29.898 DECREASED STRENGTH OF TRUNK AND BACK: ICD-10-CM

## 2024-06-10 DIAGNOSIS — G89.29 CHRONIC BILATERAL LOW BACK PAIN WITHOUT SCIATICA: Primary | ICD-10-CM

## 2024-06-10 PROCEDURE — 97750 PHYSICAL PERFORMANCE TEST: CPT | Mod: 32,PO | Performed by: PHYSICAL THERAPIST

## 2024-06-10 NOTE — PROGRESS NOTES
[facility]  Physical Therapy Treatment Note     Name: Jean Claude Dye  Clinic Number: [id number]    Therapy Diagnosis:   Encounter Diagnoses   Name Primary?    Chronic bilateral low back pain without sciatica Yes    Decreased strength of trunk and back        Physician: Linnea Ayala PA-C    Visit Date: 6/10/2024    Physician Orders: PT Eval and Treat- Healthy Back  Medical Diagnosis from Referral: lumbar back pain  Evaluation Date: 5/8/2024  Authorization Period Expiration: 12/31/24  Plan of Care Expiration: 7/18/2024  Reassessment Due: 6/9/2024  Visit # / Visits authorized: 4/20 (+1 prior auth)  MedX testing visit 2    PTA Visit #: 0/5     Time In: 4:00PM  Time Out: 5:05PM  Total Billable Time: 55 minutes  INSURANCE and OUTCOMES: Program Benefit Group with Lumbar Outcomes (Oswestry and AQoL) 1/3    Precautions: standard    Pattern of pain determined: Pattern 2    Subjective     Jean Claude Dye reports she has been feeling better overall with less pain. Mild pain currently after walking over here to PT    Patient reports tolerating previous visit   without adverse effect.  Patient reports their pain to be 1/10 on a 0-10 scale with 0 being no pain and 10 being the worst pain imaginable.  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[place]      Pt goals:  not to feel pain, move like she wants    Objective      Lumbar  Isometric Testing on Med X equipment: Testing administered by PT    Test Initial Baseline Midpoint Final   Date 5/27/24     ROM 0-33 deg     Max Peak Torque 221      Min Peak Torque 92      Flex/Ext Ratio 2.4:1     % variance  normative data -9%     % change from initial test N/A visit 1             Outcomes:  Intake Score: 22% Oswestry, AQoL 11%  Visit 6 Score:   Visit 10 Score:   Discharge Score:  Goal Score: 16%  "          Treatment     Jean Claude received the treatments listed below:      Medical MedX Treatment as follows:    MedX exercise started day 3:  Patient received neuromuscular education for 15  minutes via participation on the Medical MedX Machine. Therapist assisted patient in isolating and engaging spinal stabilization musculature in order to improve functional ability and postural control. Patient performed exercise with therapist guidance in order to accurately use pacer function, avoid valsalva, and optimally exert effort within a safe and effective range via the Arya Exertion Rating Scale. Patient instructed to perform at a midrange of exertion and to complete 15-20 repetitions within appropriate split time, with proper technique, and while maintaining safety.           Jean Claude participated in neuromuscular re-education activities to improve balance, coordination, proprioception, motor control and/or posture for 10 minutes. The following activities were included:    Both knees to chest red ball x20, 3-5 sec ea  PPT x20, 5"  Lumbar stabilization march in place x20  Lumbar stabilization leg extension 2/10       Jean Claude participated in therapeutic exercises to develop strength, endurance, ROM, flexibility, posture, and core stabilization for 30 minutes including:  Supine legs on red ball, static rest 2 min  Seated lumbar flexion 5x 5 sec       Peripheral muscle strengthening which included one set of 15-20 repetitions at a slow and controlled 10-13 second per rep pace focused on strengthening supporting musculature in order to improve body mechanics and functional mobility. Patient and therapist focused on proper form during treatment to ensure optimal strengthening of each targeted muscle group.  Machines utilized included:Torso rotation, Leg Ext, Leg Curl, Chest Press, Rowing, Hip Abd, Hip Add, and Leg Press,Triceps and Biceps        6/10/2024     4:42 PM   [facility]   Visit Number 5   VAS Pain Rating 1 "   Recumbent Bike Seat Pos. 14   Time 10   Flexion in Lying 20   Flexion in Sitting 10   Lumbar Weight 80 lbs   Repetitions 20   Rating of Perceived Exertion 4   Ice - Z Lie (in min.) 10             Nikea participated in dynamic functional therapeutic activities to improve functional performance and simulate household and community activities for 00  minutes. The following activities were included:      Nikea received manual therapy techniques for 00  minutes. The following activities were included:      Pt given cold pack for 10 minutes to low back in Z-lie.    Patient Education and Home Exercises     Home exercises include:  Rest with legs 90/90 2 min  Bilateral knees to chest with ball if available  PPT  Seated lumbar flexion  Lumbar stabilization march in place  Lumbar stabilization leg extension    Cardio program (V5): -6/10/24  Lifting education (V11): -  Posture/Lumbar roll: instruct visit 1  [employer] Discharge handout (date given): -  Equipment at home/gym membership: no    Education provided:   - PT role and POC  - HEP      Written Home Exercises Provided: Patient instructed to cont prior HEP.  Exercises were reviewed and Amadormai was able to demonstrate them prior to the end of the session.  Nikea demonstrated good  understanding of the education provided.     See EMR under Patient Instructions for exercises provided prior visit.    Assessment     Pt demonstrating much improved lumbar stabilization with exercise. Pain in ADLs is decreasing. Progressed with resistance or reps in all areas with appropriate perceived exertion. Instructed in benefits of and how to get started with cardiovascular exercise. Pt given handout for reference.    Patient is making good progress towards established goals.  Pt will continue to benefit from skilled outpatient physical therapy to address the deficits stated in the impairment chart, provide pt/family education and to maximize pt's level of independence in the home and  community environment.     Anticipated Barriers for therapy: none  Pt's spiritual, cultural and educational needs considered and pt agreeable to plan of care and goals as stated below:     Goals:   Short term goals:  6 weeks or 10 visits   - Pt will demonstrate increased lumbar MedX ROM by at least 3 degrees from the initial ROM value with improvements noted in functional ROM and ability to perform ADLs. Appropriate and Ongoing  - Pt will demonstrate increased MedX average isometric strength value by 20% from initial test resulting in improved ability to perform bending, lifting, and carrying activities safely, confidently. Appropriate and Ongoing  - Pt will report a reduction in worst pain score by 1-2 points for improved tolerance for standing/walking, household chores. Appropriate and Ongoing  - Pt able to perform HEP correctly with minimal cueing or supervision from therapist to encourage independent management of symptoms. Appropriate and Ongoing     Long term goals: 10 weeks or 20 visits   - Pt will demonstrate increased lumbar MedX ROM by at least 6 degrees from initial ROM value, resulting in improved ability to perform functional forward bending while standing and sitting. Appropriate and Ongoing  - Pt will demonstrate increased MedX average isometric strength value by 30% from initial test resulting in improved ability to perform bending, lifting, and carrying activities safely and confidently. Appropriate and Ongoing  - Pt to demonstrate ability to independently control and reduce their pain through posture positioning and mechanical movements throughout a typical day. Appropriate and Ongoing  - Pt will demonstrate reduced pain and improved functional outcomes as reported on the Oswestry Disability Index by reaching a score of 16% or less in order to demonstrate subjective improvement in pt's condition. . Appropriate and Ongoing  - Pt will demonstrate independence with the HEP at discharge. Appropriate  and Ongoing  - Pt will have 0/10 pain and be able to move like she wants to with ADLs(patient goal) Appropriate and Ongoing      Plan     Continue with established Plan of Care towards established PT goals.     Therapist: Luda Kaur, PT  6/10/2024

## 2024-06-12 ENCOUNTER — CLINICAL SUPPORT (OUTPATIENT)
Dept: REHABILITATION | Facility: HOSPITAL | Age: 44
End: 2024-06-12
Payer: COMMERCIAL

## 2024-06-12 DIAGNOSIS — G89.29 CHRONIC BILATERAL LOW BACK PAIN WITHOUT SCIATICA: Primary | ICD-10-CM

## 2024-06-12 DIAGNOSIS — R29.898 DECREASED STRENGTH OF TRUNK AND BACK: ICD-10-CM

## 2024-06-12 DIAGNOSIS — M54.50 CHRONIC BILATERAL LOW BACK PAIN WITHOUT SCIATICA: Primary | ICD-10-CM

## 2024-06-12 PROCEDURE — 97750 PHYSICAL PERFORMANCE TEST: CPT | Mod: 32,PO

## 2024-06-12 NOTE — PROGRESS NOTES
[facility] - [facility]  Physical Therapy Treatment Note     Name: Jean Claude Dye  Clinic Number: [id number]    Therapy Diagnosis:   Encounter Diagnoses   Name Primary?    Chronic bilateral low back pain without sciatica Yes    Decreased strength of trunk and back          Physician: Linnea Ayala PA-C    Visit Date: 6/12/2024    Physician Orders: PT Eval and Treat- Healthy Back  Medical Diagnosis from Referral: lumbar back pain  Evaluation Date: 5/8/2024  Authorization Period Expiration: 12/31/24  Plan of Care Expiration: 7/18/2024  Reassessment Due: 6/9/2024  Visit # / Visits authorized: 6/20 (+1 prior auth)  MedX testing visit 2    PTA Visit #: 1/5     Time In: 3:05PM  Time Out: 3:55PM  Total Billable Time: 50 minutes  INSURANCE and OUTCOMES: Program Benefit Group with Lumbar Outcomes (Oswestry and AQoL) 1/3    Precautions: standard    Pattern of pain determined: Pattern 2    Subjective     Jean Claude Dye reports feeling so much better. Seated stretch and PPT helps the most.   Patient reports tolerating previous visit   without adverse effect.  Patient reports their pain to be 1/10 on a 0-10 scale with 0 being no pain and 10 being the worst pain imaginable.  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[place]      Pt goals:  not to feel pain, move like she wants    Objective      Lumbar  Isometric Testing on Med X equipment: Testing administered by PT    Test Initial Baseline Midpoint Final   Date 5/27/24     ROM 0-33 deg     Max Peak Torque 221      Min Peak Torque 92      Flex/Ext Ratio 2.4:1     % variance  normative data -9%     % change from initial test N/A visit 1             Outcomes:  Intake Score: 22% Oswestry, AQoL 11%  Visit 6 Score:   Visit 10 Score:   Discharge Score:  Goal Score: 16%           Treatment     Jean Claude received the  "treatments listed below:      Medical MedX Treatment as follows:    MedX exercise started day 3:  Patient received neuromuscular education for 15  minutes via participation on the Medical MedX Machine. Therapist assisted patient in isolating and engaging spinal stabilization musculature in order to improve functional ability and postural control. Patient performed exercise with therapist guidance in order to accurately use pacer function, avoid valsalva, and optimally exert effort within a safe and effective range via the Arya Exertion Rating Scale. Patient instructed to perform at a midrange of exertion and to complete 15-20 repetitions within appropriate split time, with proper technique, and while maintaining safety.             6/12/2024     3:43 PM   [facility]   Visit Number 6   VAS Pain Rating 1   Recumbent Bike Seat Pos. 14   Time 10   Flexion in Lying 20   Flexion in Sitting 10   Lumbar Weight 83 lbs   Repetitions 18   Rating of Perceived Exertion 4      Nikea participated in neuromuscular re-education activities to improve balance, coordination, proprioception, motor control and/or posture for 10 minutes. The following activities were included:    Both knees to chest red ball x20, 3-5 sec ea  PPT x20, 5"  Lumbar stabilization march in place x20  Lumbar stabilization leg extension 2/10       Nikea participated in therapeutic exercises to develop strength, endurance, ROM, flexibility, posture, and core stabilization for 25 minutes including:  Supine legs on red ball, static rest 2 min  Seated lumbar flexion 5x 5 sec       Peripheral muscle strengthening which included one set of 15-20 repetitions at a slow and controlled 10-13 second per rep pace focused on strengthening supporting musculature in order to improve body mechanics and functional mobility. Patient and therapist focused on proper form during treatment to ensure optimal strengthening of each targeted muscle group.  Machines utilized " included:Torso rotation, Leg Ext, Leg Curl, Chest Press, Rowing, Hip Abd, Hip Add, and Leg Press,Triceps and Biceps              Jean Claude participated in dynamic functional therapeutic activities to improve functional performance and simulate household and community activities for 00  minutes. The following activities were included:      Jean Claude received manual therapy techniques for 00  minutes. The following activities were included:      Pt given cold pack for 10 minutes to low back in Z-lie.    Patient Education and Home Exercises     Home exercises include:  Rest with legs 90/90 2 min  Bilateral knees to chest with ball if available  PPT  Seated lumbar flexion  Lumbar stabilization march in place  Lumbar stabilization leg extension    Cardio program (V5): -6/10/24  Lifting education (V11): -  Posture/Lumbar roll: instruct visit 1  [employer] Discharge handout (date given): -  Equipment at home/gym membership: no    Education provided:   - PT role and POC  - HEP      Written Home Exercises Provided: Patient instructed to cont prior HEP.  Exercises were reviewed and Jean Claude was able to demonstrate them prior to the end of the session.  Jean Claude demonstrated good  understanding of the education provided.     See EMR under Patient Instructions for exercises provided prior visit.    Assessment     Pt demonstrating much improved lumbar stabilization with exercise. Encouraged her to cont to stretch. Progressed with resistance or reps in all areas with appropriate perceived exertion.     Patient is making good progress towards established goals.  Pt will continue to benefit from skilled outpatient physical therapy to address the deficits stated in the impairment chart, provide pt/family education and to maximize pt's level of independence in the home and community environment.     Anticipated Barriers for therapy: none  Pt's spiritual, cultural and educational needs considered and pt agreeable to plan of care and goals as  stated below:     Goals:   Short term goals:  6 weeks or 10 visits   - Pt will demonstrate increased lumbar MedX ROM by at least 3 degrees from the initial ROM value with improvements noted in functional ROM and ability to perform ADLs. Appropriate and Ongoing  - Pt will demonstrate increased MedX average isometric strength value by 20% from initial test resulting in improved ability to perform bending, lifting, and carrying activities safely, confidently. Appropriate and Ongoing  - Pt will report a reduction in worst pain score by 1-2 points for improved tolerance for standing/walking, household chores. Appropriate and Ongoing  - Pt able to perform HEP correctly with minimal cueing or supervision from therapist to encourage independent management of symptoms. Appropriate and Ongoing     Long term goals: 10 weeks or 20 visits   - Pt will demonstrate increased lumbar MedX ROM by at least 6 degrees from initial ROM value, resulting in improved ability to perform functional forward bending while standing and sitting. Appropriate and Ongoing  - Pt will demonstrate increased MedX average isometric strength value by 30% from initial test resulting in improved ability to perform bending, lifting, and carrying activities safely and confidently. Appropriate and Ongoing  - Pt to demonstrate ability to independently control and reduce their pain through posture positioning and mechanical movements throughout a typical day. Appropriate and Ongoing  - Pt will demonstrate reduced pain and improved functional outcomes as reported on the Oswestry Disability Index by reaching a score of 16% or less in order to demonstrate subjective improvement in pt's condition. . Appropriate and Ongoing  - Pt will demonstrate independence with the HEP at discharge. Appropriate and Ongoing  - Pt will have 0/10 pain and be able to move like she wants to with ADLs(patient goal) Appropriate and Ongoing      Plan     Continue with established Plan of  Care towards established PT goals.     Therapist: Carolina Boyd, PTA  6/12/2024

## 2024-06-20 ENCOUNTER — TELEPHONE (OUTPATIENT)
Dept: REHABILITATION | Facility: HOSPITAL | Age: 44
End: 2024-06-20
Payer: COMMERCIAL

## 2024-06-21 ENCOUNTER — CLINICAL SUPPORT (OUTPATIENT)
Dept: REHABILITATION | Facility: HOSPITAL | Age: 44
End: 2024-06-21
Payer: COMMERCIAL

## 2024-06-21 DIAGNOSIS — M54.50 CHRONIC BILATERAL LOW BACK PAIN WITHOUT SCIATICA: Primary | ICD-10-CM

## 2024-06-21 DIAGNOSIS — G89.29 CHRONIC BILATERAL LOW BACK PAIN WITHOUT SCIATICA: Primary | ICD-10-CM

## 2024-06-21 DIAGNOSIS — R29.898 DECREASED STRENGTH OF TRUNK AND BACK: ICD-10-CM

## 2024-06-21 PROCEDURE — 97750 PHYSICAL PERFORMANCE TEST: CPT | Mod: 32,PO | Performed by: PHYSICAL THERAPIST

## 2024-06-21 NOTE — PROGRESS NOTES
[facility]  Physical Therapy Treatment Note     Name: Jean Claude Dye  Clinic Number: [id number]    Therapy Diagnosis:   Encounter Diagnoses   Name Primary?    Chronic bilateral low back pain without sciatica Yes    Decreased strength of trunk and back          Physician: Linnea Ayala PA-C    Visit Date: 6/21/2024    Physician Orders: PT Eval and Treat- Healthy Back  Medical Diagnosis from Referral: lumbar back pain  Evaluation Date: 5/8/2024  Authorization Period Expiration: 12/31/24  Plan of Care Expiration: 7/18/2024  Reassessment Due: 6/9/2024  Visit # / Visits authorized: 6/20 (+1 prior auth)  MedX testing visit 2    PTA Visit #: 0/5     Time In: 6:50AM  Time Out: 7:50AM  Total Billable Time: 55 minutes  INSURANCE and OUTCOMES: Program Benefit Group with Lumbar Outcomes (Oswestry and AQoL) 1/3    Precautions: standard    Pattern of pain determined: Pattern 2    Subjective     Jean Claude Dye reports she has been busy with work and is having some increased pain currently.  Patient reports tolerating previous visit   without adverse effect.  Patient reports their pain to be 2/10 on a 0-10 scale with 0 being no pain and 10 being the worst pain imaginable.  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[place]      Pt goals:  not to feel pain, move like she wants    Objective      Lumbar  Isometric Testing on Med X equipment: Testing administered by PT    Test Initial Baseline Midpoint Final   Date 5/27/24     ROM 0-33 deg     Max Peak Torque 221      Min Peak Torque 92      Flex/Ext Ratio 2.4:1     % variance  normative data -9%     % change from initial test N/A visit 1             Outcomes:  Intake Score: 22% Oswestry, AQoL 11%  Visit 6 Score:   Visit 10 Score:   Discharge Score:  Goal Score: 16%           Treatment     Jean Claude received  "the treatments listed below:      Medical MedX Treatment as follows:    MedX exercise started day 3:  Patient received neuromuscular education for 15  minutes via participation on the Medical MedX Machine. Therapist assisted patient in isolating and engaging spinal stabilization musculature in order to improve functional ability and postural control. Patient performed exercise with therapist guidance in order to accurately use pacer function, avoid valsalva, and optimally exert effort within a safe and effective range via the Arya Exertion Rating Scale. Patient instructed to perform at a midrange of exertion and to complete 15-20 repetitions within appropriate split time, with proper technique, and while maintaining safety.          Nikea participated in neuromuscular re-education activities to improve balance, coordination, proprioception, motor control and/or posture for 10 minutes. The following activities were included:    Both knees to chest red ball x20, 3-5 sec ea  PPT x20, 5"  Lumbar stabilization march in place x20  Lumbar stabilization leg extension 2/10  Hamstring stretch 3x 30 sec ea       Nikea participated in therapeutic exercises to develop strength, endurance, ROM, flexibility, posture, and core stabilization for 30 minutes including:  Supine legs on red ball, static rest 2 min  Hamstring stretch 3x 30 sec ea  Seated lumbar flexion 5x 5 sec       Peripheral muscle strengthening which included one set of 15-20 repetitions at a slow and controlled 10-13 second per rep pace focused on strengthening supporting musculature in order to improve body mechanics and functional mobility. Patient and therapist focused on proper form during treatment to ensure optimal strengthening of each targeted muscle group.  Machines utilized included:Torso rotation, Leg Ext, Leg Curl, Chest Press, Rowing, Hip Abd, Hip Add, and Leg Press,Triceps and Biceps        6/21/2024     7:10 AM   [facility]   Visit Number 7 "   VAS Pain Rating 2   Recumbent Bike Seat Pos. 14   Time 10   Flexion in Lying 20   Flexion in Sitting 10   Lumbar Weight 83 lbs   Repetitions 20   Rating of Perceived Exertion 4   Ice - Z Lie (in min.) 5         Nikea participated in dynamic functional therapeutic activities to improve functional performance and simulate household and community activities for 00  minutes. The following activities were included:      Nikea received manual therapy techniques for 00  minutes. The following activities were included:      Pt given cold pack for 05 minutes to low back in Z-lie.    Patient Education and Home Exercises     Home exercises include:  Rest with legs 90/90 2 min  Bilateral knees to chest with ball if available  PPT  Seated lumbar flexion  Lumbar stabilization march in place  Lumbar stabilization leg extension    Cardio program (V5): -6/10/24  Lifting education (V11): -  Posture/Lumbar roll: instruct visit 1  [employer] Discharge handout (date given): -  Equipment at home/gym membership: no    Education provided:   - PT role and POC  - HEP      Written Home Exercises Provided: Patient instructed to cont prior HEP.  Exercises were reviewed and Jean Claude was able to demonstrate them prior to the end of the session.  [place] demonstrated good  understanding of the education provided.     See EMR under Patient Instructions for exercises provided prior visit.    Assessment     Added hamstring stretching. Gradual improvement in core strength. Improved awareness of posture. Using lumbar support with sitting. Cont to reinforce importance of HEP.  Patient is making good progress towards established goals.  Pt will continue to benefit from skilled outpatient physical therapy to address the deficits stated in the impairment chart, provide pt/family education and to maximize pt's level of independence in the home and community environment.     Anticipated Barriers for therapy: none  Pt's spiritual, cultural and educational  needs considered and pt agreeable to plan of care and goals as stated below:     Goals:   Short term goals:  6 weeks or 10 visits   - Pt will demonstrate increased lumbar MedX ROM by at least 3 degrees from the initial ROM value with improvements noted in functional ROM and ability to perform ADLs. Appropriate and Ongoing  - Pt will demonstrate increased MedX average isometric strength value by 20% from initial test resulting in improved ability to perform bending, lifting, and carrying activities safely, confidently. Appropriate and Ongoing  - Pt will report a reduction in worst pain score by 1-2 points for improved tolerance for standing/walking, household chores. Appropriate and Ongoing  - Pt able to perform HEP correctly with minimal cueing or supervision from therapist to encourage independent management of symptoms. Appropriate and Ongoing     Long term goals: 10 weeks or 20 visits   - Pt will demonstrate increased lumbar MedX ROM by at least 6 degrees from initial ROM value, resulting in improved ability to perform functional forward bending while standing and sitting. Appropriate and Ongoing  - Pt will demonstrate increased MedX average isometric strength value by 30% from initial test resulting in improved ability to perform bending, lifting, and carrying activities safely and confidently. Appropriate and Ongoing  - Pt to demonstrate ability to independently control and reduce their pain through posture positioning and mechanical movements throughout a typical day. Appropriate and Ongoing  - Pt will demonstrate reduced pain and improved functional outcomes as reported on the Oswestry Disability Index by reaching a score of 16% or less in order to demonstrate subjective improvement in pt's condition. . Appropriate and Ongoing  - Pt will demonstrate independence with the HEP at discharge. Appropriate and Ongoing  - Pt will have 0/10 pain and be able to move like she wants to with ADLs(patient goal)  Appropriate and Ongoing      Plan     Continue with established Plan of Care towards established PT goals.     Therapist: Luda Kaur, PT  6/21/2024

## 2024-06-26 ENCOUNTER — CLINICAL SUPPORT (OUTPATIENT)
Dept: REHABILITATION | Facility: HOSPITAL | Age: 44
End: 2024-06-26
Payer: COMMERCIAL

## 2024-06-26 DIAGNOSIS — M54.50 LUMBAR BACK PAIN: ICD-10-CM

## 2024-06-26 DIAGNOSIS — G89.29 CHRONIC BILATERAL LOW BACK PAIN WITHOUT SCIATICA: Primary | ICD-10-CM

## 2024-06-26 DIAGNOSIS — M54.50 CHRONIC BILATERAL LOW BACK PAIN WITHOUT SCIATICA: Primary | ICD-10-CM

## 2024-06-26 DIAGNOSIS — R29.898 DECREASED STRENGTH OF TRUNK AND BACK: ICD-10-CM

## 2024-06-26 PROCEDURE — 97750 PHYSICAL PERFORMANCE TEST: CPT | Mod: 32,PO

## 2024-06-26 NOTE — PROGRESS NOTES
[facility]  Physical Therapy Treatment Note     Name: Jean Claude Dye  Clinic Number: [id number]    Therapy Diagnosis:   Encounter Diagnoses   Name Primary?    Chronic bilateral low back pain without sciatica Yes    Lumbar back pain     Decreased strength of trunk and back            Physician: Linnea Ayala PA-C    Visit Date: 6/26/2024    Physician Orders: PT Eval and Treat- Healthy Back  Medical Diagnosis from Referral: lumbar back pain  Evaluation Date: 5/8/2024  Authorization Period Expiration: 12/31/24  Plan of Care Expiration: 7/18/2024  Reassessment Due: 6/9/2024  Visit # / Visits authorized: 7/20 (+1 prior auth)  MedX testing visit 2    PTA Visit #: 1/5     Time In: 4:00PM  Time Out: 5:10PM  Total Billable Time: 55 minutes  INSURANCE and OUTCOMES: Program Benefit Group with Lumbar Outcomes (Oswestry and AQoL) 1/3    Precautions: standard    Pattern of pain determined: Pattern 2    Subjective     Jean Claude Dye reports having less pain today due to only working one job and not 2 today.  Patient reports tolerating previous visit   without adverse effect.  Patient reports their pain to be 2/10 on a 0-10 scale with 0 being no pain and 10 being the worst pain imaginable.  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[place]      Pt goals:  not to feel pain, move like she wants    Objective      Lumbar  Isometric Testing on Med X equipment: Testing administered by PT    Test Initial Baseline Midpoint Final   Date 5/27/24     ROM 0-33 deg     Max Peak Torque 221      Min Peak Torque 92      Flex/Ext Ratio 2.4:1     % variance  normative data -9%     % change from initial test N/A visit 1             Outcomes:  Intake Score: 22% Oswestry, AQoL 11%  Visit 6 Score:   Visit 10 Score:   Discharge Score:  Goal Score: 16%  "          Treatment     Jean Claude received the treatments listed below:      Medical MedX Treatment as follows:    MedX exercise started day 3:  Patient received neuromuscular education for 15  minutes via participation on the Medical MedX Machine. Therapist assisted patient in isolating and engaging spinal stabilization musculature in order to improve functional ability and postural control. Patient performed exercise with therapist guidance in order to accurately use pacer function, avoid valsalva, and optimally exert effort within a safe and effective range via the Arya Exertion Rating Scale. Patient instructed to perform at a midrange of exertion and to complete 15-20 repetitions within appropriate split time, with proper technique, and while maintaining safety.              6/26/2024     4:30 PM   [facility]   Visit Number 8   VAS Pain Rating 1   Recumbent Bike Seat Pos. 14   Time 10   Flexion in Lying 20   Flexion in Sitting 10   Lumbar Weight 86 lbs   Repetitions 18   Rating of Perceived Exertion 4   Ice - Z Lie (in min.) 10      Jean Claude participated in neuromuscular re-education activities to improve balance, coordination, proprioception, motor control and/or posture for 10 minutes. The following activities were included:    Both knees to chest red ball x20, 3-5 sec ea  PPT x20, 5"  Lumbar stabilization march in place x20  Lumbar stabilization leg extension 2/10  Hamstring stretch 3x 30 sec ea       Jean Claude participated in therapeutic exercises to develop strength, endurance, ROM, flexibility, posture, and core stabilization for 30 minutes including:  Supine legs on red ball, static rest 2 min  Hamstring stretch 3x 30 sec ea  Seated lumbar flexion 5x 5 sec       Peripheral muscle strengthening which included one set of 15-20 repetitions at a slow and controlled 10-13 second per rep pace focused on strengthening supporting musculature in order to improve body mechanics and functional mobility. Patient and " therapist focused on proper form during treatment to ensure optimal strengthening of each targeted muscle group.  Machines utilized included:Torso rotation, Leg Ext, Leg Curl, Chest Press, Rowing, Hip Abd, Hip Add, and Leg Press,Triceps and Biceps          Jean Claude participated in dynamic functional therapeutic activities to improve functional performance and simulate household and community activities for 00  minutes. The following activities were included:      Jean Claude received manual therapy techniques for 00  minutes. The following activities were included:      Pt given cold pack for 05 minutes to low back in Z-lie.    Patient Education and Home Exercises     Home exercises include:  Rest with legs 90/90 2 min  Bilateral knees to chest with ball if available  PPT  Seated lumbar flexion  Lumbar stabilization march in place  Lumbar stabilization leg extension    Cardio program (V5): -6/10/24  Lifting education (V11): -  Posture/Lumbar roll: instruct visit 1  [employer] Discharge handout (date given): -  Equipment at home/gym membership: no    Education provided:   - PT role and POC  - HEP      Written Home Exercises Provided: Patient instructed to cont prior HEP.  Exercises were reviewed and Jean Claude was able to demonstrate them prior to the end of the session.  Jean Claude demonstrated good  understanding of the education provided.     See EMR under Patient Instructions for exercises provided prior visit.    Assessment   Pt without LBP post session. Pt progressing well with lumbar medx extension machine. Pt has been compliant with her HEP. Stretching help, but when she works her to jobs in one day with has symptoms.  Patient is making good progress towards established goals.  Pt will continue to benefit from skilled outpatient physical therapy to address the deficits stated in the impairment chart, provide pt/family education and to maximize pt's level of independence in the home and community environment.     Anticipated  Barriers for therapy: none  Pt's spiritual, cultural and educational needs considered and pt agreeable to plan of care and goals as stated below:     Goals:   Short term goals:  6 weeks or 10 visits   - Pt will demonstrate increased lumbar MedX ROM by at least 3 degrees from the initial ROM value with improvements noted in functional ROM and ability to perform ADLs. Appropriate and Ongoing  - Pt will demonstrate increased MedX average isometric strength value by 20% from initial test resulting in improved ability to perform bending, lifting, and carrying activities safely, confidently. Appropriate and Ongoing  - Pt will report a reduction in worst pain score by 1-2 points for improved tolerance for standing/walking, household chores. Appropriate and Ongoing  - Pt able to perform HEP correctly with minimal cueing or supervision from therapist to encourage independent management of symptoms. Appropriate and Ongoing     Long term goals: 10 weeks or 20 visits   - Pt will demonstrate increased lumbar MedX ROM by at least 6 degrees from initial ROM value, resulting in improved ability to perform functional forward bending while standing and sitting. Appropriate and Ongoing  - Pt will demonstrate increased MedX average isometric strength value by 30% from initial test resulting in improved ability to perform bending, lifting, and carrying activities safely and confidently. Appropriate and Ongoing  - Pt to demonstrate ability to independently control and reduce their pain through posture positioning and mechanical movements throughout a typical day. Appropriate and Ongoing  - Pt will demonstrate reduced pain and improved functional outcomes as reported on the Oswestry Disability Index by reaching a score of 16% or less in order to demonstrate subjective improvement in pt's condition. . Appropriate and Ongoing  - Pt will demonstrate independence with the HEP at discharge. Appropriate and Ongoing  - Pt will have 0/10 pain and  be able to move like she wants to with ADLs(patient goal) Appropriate and Ongoing      Plan     Continue with established Plan of Care towards established PT goals.     Therapist: Carolina Boyd, PTA  6/26/2024

## 2024-06-26 NOTE — PROGRESS NOTES
[facility]  Physical Therapy Treatment Note     Name: Jean Claude Dye  Clinic Number: [id number]    Therapy Diagnosis:   No diagnosis found.        Physician: Linnea Ayala PA-C    Visit Date: 6/26/2024    Physician Orders: PT Eval and Treat- Healthy Back  Medical Diagnosis from Referral: lumbar back pain  Evaluation Date: 5/8/2024  Authorization Period Expiration: 12/31/24  Plan of Care Expiration: 7/18/2024  Reassessment Due: 6/9/2024  Visit # / Visits authorized: 6/20 (+1 prior auth)  MedX testing visit 2    PTA Visit #: 0/5     Time In: 6:50AM  Time Out: 7:50AM  Total Billable Time: 55 minutes  INSURANCE and OUTCOMES: Program Benefit Group with Lumbar Outcomes (Oswestry and AQoL) 1/3    Precautions: standard    Pattern of pain determined: Pattern 2    Subjective     Jean Claude Dye reports she has been busy with work and is having some increased pain currently.  Patient reports tolerating previous visit   without adverse effect.  Patient reports their pain to be 2/10 on a 0-10 scale with 0 being no pain and 10 being the worst pain imaginable.  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[place]      Pt goals:  not to feel pain, move like she wants    Objective      Lumbar  Isometric Testing on Med X equipment: Testing administered by PT    Test Initial Baseline Midpoint Final   Date 5/27/24     ROM 0-33 deg     Max Peak Torque 221      Min Peak Torque 92      Flex/Ext Ratio 2.4:1     % variance  normative data -9%     % change from initial test N/A visit 1             Outcomes:  Intake Score: 22% Oswestry, AQoL 11%  Visit 6 Score:   Visit 10 Score:   Discharge Score:  Goal Score: 16%           Treatment     Jean Claude received the treatments listed below:      Medical MedX Treatment as follows:    MedX exercise started day 3:  Patient  "received neuromuscular education for 15  minutes via participation on the [employer] Machine. Therapist assisted patient in isolating and engaging spinal stabilization musculature in order to improve functional ability and postural control. Patient performed exercise with therapist guidance in order to accurately use pacer function, avoid valsalva, and optimally exert effort within a safe and effective range via the Arya Exertion Rating Scale. Patient instructed to perform at a midrange of exertion and to complete 15-20 repetitions within appropriate split time, with proper technique, and while maintaining safety.          Jean Claude participated in neuromuscular re-education activities to improve balance, coordination, proprioception, motor control and/or posture for 10 minutes. The following activities were included:    Both knees to chest red ball x20, 3-5 sec ea  PPT x20, 5"  Lumbar stabilization march in place x20  Lumbar stabilization leg extension 2/10  Hamstring stretch 3x 30 sec ea       Nikea participated in therapeutic exercises to develop strength, endurance, ROM, flexibility, posture, and core stabilization for 30 minutes including:  Supine legs on red ball, static rest 2 min  Hamstring stretch 3x 30 sec ea  Seated lumbar flexion 5x 5 sec       Peripheral muscle strengthening which included one set of 15-20 repetitions at a slow and controlled 10-13 second per rep pace focused on strengthening supporting musculature in order to improve body mechanics and functional mobility. Patient and therapist focused on proper form during treatment to ensure optimal strengthening of each targeted muscle group.  Machines utilized included:Torso rotation, Leg Ext, Leg Curl, Chest Press, Rowing, Hip Abd, Hip Add, and Leg Press,Triceps and Biceps          Jean Claude participated in dynamic functional therapeutic activities to improve functional performance and simulate household and community activities for 00  minutes. The " following activities were included:      Nikea received manual therapy techniques for 00  minutes. The following activities were included:      Pt given cold pack for 05 minutes to low back in Z-lie.    Patient Education and Home Exercises     Home exercises include:  Rest with legs 90/90 2 min  Bilateral knees to chest with ball if available  PPT  Seated lumbar flexion  Lumbar stabilization march in place  Lumbar stabilization leg extension    Cardio program (V5): -6/10/24  Lifting education (V11): -  Posture/Lumbar roll: instruct visit 1  [employer] Discharge handout (date given): -  Equipment at home/gym membership: no    Education provided:   - PT role and POC  - HEP      Written Home Exercises Provided: Patient instructed to cont prior HEP.  Exercises were reviewed and Jena Claude was able to demonstrate them prior to the end of the session.  [place] demonstrated good  understanding of the education provided.     See EMR under Patient Instructions for exercises provided prior visit.    Assessment     Added hamstring stretching. Gradual improvement in core strength. Improved awareness of posture. Using lumbar support with sitting. Cont to reinforce importance of HEP.  Patient is making good progress towards established goals.  Pt will continue to benefit from skilled outpatient physical therapy to address the deficits stated in the impairment chart, provide pt/family education and to maximize pt's level of independence in the home and community environment.     Anticipated Barriers for therapy: none  Pt's spiritual, cultural and educational needs considered and pt agreeable to plan of care and goals as stated below:     Goals:   Short term goals:  6 weeks or 10 visits   - Pt will demonstrate increased lumbar MedX ROM by at least 3 degrees from the initial ROM value with improvements noted in functional ROM and ability to perform ADLs. Appropriate and Ongoing  - Pt will demonstrate increased MedX average isometric  strength value by 20% from initial test resulting in improved ability to perform bending, lifting, and carrying activities safely, confidently. Appropriate and Ongoing  - Pt will report a reduction in worst pain score by 1-2 points for improved tolerance for standing/walking, household chores. Appropriate and Ongoing  - Pt able to perform HEP correctly with minimal cueing or supervision from therapist to encourage independent management of symptoms. Appropriate and Ongoing     Long term goals: 10 weeks or 20 visits   - Pt will demonstrate increased lumbar MedX ROM by at least 6 degrees from initial ROM value, resulting in improved ability to perform functional forward bending while standing and sitting. Appropriate and Ongoing  - Pt will demonstrate increased MedX average isometric strength value by 30% from initial test resulting in improved ability to perform bending, lifting, and carrying activities safely and confidently. Appropriate and Ongoing  - Pt to demonstrate ability to independently control and reduce their pain through posture positioning and mechanical movements throughout a typical day. Appropriate and Ongoing  - Pt will demonstrate reduced pain and improved functional outcomes as reported on the Oswestry Disability Index by reaching a score of 16% or less in order to demonstrate subjective improvement in pt's condition. . Appropriate and Ongoing  - Pt will demonstrate independence with the HEP at discharge. Appropriate and Ongoing  - Pt will have 0/10 pain and be able to move like she wants to with ADLs(patient goal) Appropriate and Ongoing      Plan     Continue with established Plan of Care towards established PT goals.     Therapist: Carolina Boyd, PTA  6/26/2024

## 2024-06-28 ENCOUNTER — CLINICAL SUPPORT (OUTPATIENT)
Dept: REHABILITATION | Facility: HOSPITAL | Age: 44
End: 2024-06-28
Payer: COMMERCIAL

## 2024-06-28 DIAGNOSIS — G89.29 CHRONIC BILATERAL LOW BACK PAIN WITHOUT SCIATICA: Primary | ICD-10-CM

## 2024-06-28 DIAGNOSIS — M54.50 CHRONIC BILATERAL LOW BACK PAIN WITHOUT SCIATICA: Primary | ICD-10-CM

## 2024-06-28 DIAGNOSIS — R29.898 DECREASED STRENGTH OF TRUNK AND BACK: ICD-10-CM

## 2024-06-28 PROCEDURE — 97750 PHYSICAL PERFORMANCE TEST: CPT | Mod: 32,PO

## 2024-06-28 NOTE — PROGRESS NOTES
[facility]  Physical Therapy Treatment Note     Name: Jean Claude Dye  Clinic Number: [id number]        Therapy Diagnosis:        Encounter Diagnoses   Name Primary?    Chronic bilateral low back pain without sciatica Yes    Lumbar back pain      Decreased strength of trunk and back             Physician: Linnea Ayala PA-C    Visit Date: 6/28/2024    Physician Orders: PT Eval and Treat- Healthy Back  Medical Diagnosis from Referral: lumbar back pain  Evaluation Date: 5/8/2024  Authorization Period Expiration: 12/31/24  Plan of Care Expiration: 7/18/2024  Reassessment Due: 6/9/2024  Visit # / Visits authorized: 9/20 (+1 prior auth)  MedX testing visit 2    PTA Visit #: 2/5     Time In: 7:00 AM  Time Out: 8:10 AM  Total Billable Time: 55 minutes  INSURANCE and OUTCOMES: Program Benefit Group with Lumbar Outcomes (Oswestry and AQoL) 1/3    Precautions: standard    Pattern of pain determined: Pattern 2    Subjective     Jean Claude Dye reports still feeling good with minimal LBP. She stated having more R shoulder pain today.  Patient reports tolerating previous visit   without adverse effect.  Patient reports their pain to be 2/10 on a 0-10 scale with 0 being no pain and 10 being the worst pain imaginable.  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[place]      Pt goals:  not to feel pain, move like she wants    Objective      Lumbar  Isometric Testing on Med X equipment: Testing administered by PT    Test Initial Baseline Midpoint Final   Date 5/27/24     ROM 0-33 deg     Max Peak Torque 221      Min Peak Torque 92      Flex/Ext Ratio 2.4:1     % variance  normative data -9%     % change from initial test N/A visit 1             Outcomes:  Intake Score: 22% Oswestry, AQoL 11%  Visit 6 Score:   Visit 10 Score:   Discharge Score:  Goal  "Score: 16%           Treatment     Jean Claude received the treatments listed below:      Medical MedX Treatment as follows:    MedX exercise started day 3:  Patient received neuromuscular education for 15  minutes via participation on the Medical MedX Machine. Therapist assisted patient in isolating and engaging spinal stabilization musculature in order to improve functional ability and postural control. Patient performed exercise with therapist guidance in order to accurately use pacer function, avoid valsalva, and optimally exert effort within a safe and effective range via the Arya Exertion Rating Scale. Patient instructed to perform at a midrange of exertion and to complete 15-20 repetitions within appropriate split time, with proper technique, and while maintaining safety.              6/28/2024    10:09 AM   [facility]   Visit Number 9   VAS Pain Rating 1   Recumbent Bike Seat Pos. 14   Time 10   Flexion in Lying 20   Flexion in Sitting 10   Lumbar Weight 86 lbs   Repetitions 20   Rating of Perceived Exertion 4   Ice - Z Lie (in min.) 10      Jean Claude participated in neuromuscular re-education activities to improve balance, coordination, proprioception, motor control and/or posture for 10 minutes. The following activities were included:    Both knees to chest red ball x20, 3-5 sec ea  PPT x20, 5"  Lumbar stabilization march in place x20  Lumbar stabilization leg extension 2/10  Hamstring stretch 3x 30 sec ea       [place] participated in therapeutic exercises to develop strength, endurance, ROM, flexibility, posture, and core stabilization for 30 minutes including:  Supine legs on red ball, static rest 2 min  Hamstring stretch 3x 30 sec ea  Seated lumbar flexion 5x 5 sec       Peripheral muscle strengthening which included one set of 15-20 repetitions at a slow and controlled 10-13 second per rep pace focused on strengthening supporting musculature in order to improve body mechanics and functional mobility. " Patient and therapist focused on proper form during treatment to ensure optimal strengthening of each targeted muscle group.  Machines utilized included:Torso rotation, Leg Ext, Leg Curl, Chest Press, Rowing, Hip Abd, Hip Add, and Leg Press,Triceps and Biceps-No UE machines performed.          Nikea participated in dynamic functional therapeutic activities to improve functional performance and simulate household and community activities for 00  minutes. The following activities were included:      Nikea received manual therapy techniques for 00  minutes. The following activities were included:      Pt given cold pack for 10 minutes to low back and R shoulder in Z-lie.    Patient Education and Home Exercises     Home exercises include:  Rest with legs 90/90 2 min  Bilateral knees to chest with ball if available  PPT  Seated lumbar flexion  Lumbar stabilization march in place  Lumbar stabilization leg extension    Cardio program (V5): -6/10/24  Lifting education (V11): -  Posture/Lumbar roll: instruct visit 1  [employer] Discharge handout (date given): -  Equipment at home/gym membership: no    Education provided:   - PT role and POC  - HEP      Written Home Exercises Provided: Patient instructed to cont prior HEP.  Exercises were reviewed and Jean Claude was able to demonstrate them prior to the end of the session.  [place] demonstrated good  understanding of the education provided.     See EMR under Patient Instructions for exercises provided prior visit.    Assessment   Pt c/o LBP post session. Pt progressing well with lumbar medx extension machine and  peripheral machines. Pt did not perform UE machines due to shoulder pain. Pt has been compliant with her HEP and mindful of her posture.  Patient is making good progress towards established goals.  Pt will continue to benefit from skilled outpatient physical therapy to address the deficits stated in the impairment chart, provide pt/family education and to maximize pt's  level of independence in the home and community environment.     Anticipated Barriers for therapy: none  Pt's spiritual, cultural and educational needs considered and pt agreeable to plan of care and goals as stated below:     Goals:   Short term goals:  6 weeks or 10 visits   - Pt will demonstrate increased lumbar MedX ROM by at least 3 degrees from the initial ROM value with improvements noted in functional ROM and ability to perform ADLs. Appropriate and Ongoing  - Pt will demonstrate increased MedX average isometric strength value by 20% from initial test resulting in improved ability to perform bending, lifting, and carrying activities safely, confidently. Appropriate and Ongoing  - Pt will report a reduction in worst pain score by 1-2 points for improved tolerance for standing/walking, household chores. Appropriate and Ongoing  - Pt able to perform HEP correctly with minimal cueing or supervision from therapist to encourage independent management of symptoms. Appropriate and Ongoing     Long term goals: 10 weeks or 20 visits   - Pt will demonstrate increased lumbar MedX ROM by at least 6 degrees from initial ROM value, resulting in improved ability to perform functional forward bending while standing and sitting. Appropriate and Ongoing  - Pt will demonstrate increased MedX average isometric strength value by 30% from initial test resulting in improved ability to perform bending, lifting, and carrying activities safely and confidently. Appropriate and Ongoing  - Pt to demonstrate ability to independently control and reduce their pain through posture positioning and mechanical movements throughout a typical day. Appropriate and Ongoing  - Pt will demonstrate reduced pain and improved functional outcomes as reported on the Oswestry Disability Index by reaching a score of 16% or less in order to demonstrate subjective improvement in pt's condition. . Appropriate and Ongoing  - Pt will demonstrate independence  with the HEP at discharge. Appropriate and Ongoing  - Pt will have 0/10 pain and be able to move like she wants to with ADLs(patient goal) Appropriate and Ongoing      Plan     Continue with established Plan of Care towards established PT goals.     Therapist: Carolina Boyd, PTA  6/28/2024

## 2024-07-05 ENCOUNTER — CLINICAL SUPPORT (OUTPATIENT)
Dept: REHABILITATION | Facility: HOSPITAL | Age: 44
End: 2024-07-05
Payer: COMMERCIAL

## 2024-07-05 DIAGNOSIS — G89.29 CHRONIC BILATERAL LOW BACK PAIN WITHOUT SCIATICA: Primary | ICD-10-CM

## 2024-07-05 DIAGNOSIS — M54.50 CHRONIC BILATERAL LOW BACK PAIN WITHOUT SCIATICA: Primary | ICD-10-CM

## 2024-07-05 DIAGNOSIS — R29.898 DECREASED STRENGTH OF TRUNK AND BACK: ICD-10-CM

## 2024-07-05 PROBLEM — N32.81 OAB (OVERACTIVE BLADDER): Status: ACTIVE | Noted: 2024-07-05

## 2024-07-05 PROCEDURE — 97750 PHYSICAL PERFORMANCE TEST: CPT | Mod: 32,PO | Performed by: PHYSICAL THERAPIST

## 2024-07-05 NOTE — PROGRESS NOTES
[facility]  Physical Therapy Treatment Note     Name: Jean Claude Dye  Clinic Number: [id number]    Therapy Diagnosis:   Encounter Diagnoses   Name Primary?    Chronic bilateral low back pain without sciatica Yes    Decreased strength of trunk and back            Physician: Linnea Ayala PA-C    Visit Date: 7/5/2024    Physician Orders: PT Eval and Treat- Healthy [facility]  Medical Diagnosis from Referral: lumbar back pain  Evaluation Date: 5/8/2024  Authorization Period Expiration: 12/31/24  Plan of Care Expiration: 7/18/2024  Reassessment 7/5/24  Reassessment Due: 20th visit  Visit # / Visits authorized: 9/20 (+1 prior auth)  MedX testing visit 2    PTA Visit #: 0/5     Time In: 9:00 AM  Time Out: 10:03 AM  Total Billable Time: 53 minutes  INSURANCE and OUTCOMES: Program Benefit Group with Lumbar Outcomes (Oswestry and AQoL) 1/3    Precautions: standard    Pattern of pain determined: Pattern 2    Subjective     Jean Claude Dye reports min to no LBP today.  Patient reports tolerating previous visit   without adverse effect.  Patient reports their pain to be 0-1/10 on a 0-10 scale with 0 being no pain and 10 being the worst pain imaginable.  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[place]      Pt goals:  not to feel pain, move like she wants    Objective      Lumbar  Isometric Testing on Med X equipment: Testing administered by PT    Test Initial Baseline Midpoint Final   Date 5/27/24 7/5/24    ROM 0-33 deg 0-36 deg    Max Peak Torque 221  277    Min Peak Torque 92  181    Flex/Ext Ratio 2.4:1 1.5:1    % variance  normative data -9% +87%    % change from initial test N/A visit 1 +104%            Outcomes:  Intake Score: 22% Oswestry, AQoL 11%  Visit 6 Score:   Visit 10 Score:  6% Oswestry  Discharge Score:  Goal Score: 16%            Treatment     Jean Claude received the treatments listed below:          Jean Claude was seen for performance testing for spinal musculature  to engage spinal musculature correctly for isometric testing within patient's range of motion, with comparison to baseline testing for  15 minutes.  This includes the MedX practice exercise component and practice and standard testing.  Med x dynamic exercise and testing performed with instructions to guide the patient safely through the isometric testing.  Patient informed to perform isometric test correctly, and safely, building best force they safely can and not pushing through pain.  Patient demonstrated understanding of information       Medical MedX Treatment as follows:    MedX exercise started day 3:  Patient received neuromuscular education for 00  minutes via participation on the Medical MedX Machine. Therapist assisted patient in isolating and engaging spinal stabilization musculature in order to improve functional ability and postural control. Patient performed exercise with therapist guidance in order to accurately use pacer function, avoid valsalva, and optimally exert effort within a safe and effective range via the Arya Exertion Rating Scale. Patient instructed to perform at a midrange of exertion and to complete 15-20 repetitions within appropriate split time, with proper technique, and while maintaining safety.           7/5/2024     9:25 AM   [facility]   Visit Number 10   VAS Pain Rating 1   Recumbent Bike Seat Pos. 14   Time 10   Flexion in Lying 20   Flexion in Sitting 10   Lumbar Flexion 36   Lumbar Extension 0   Lumbar Peak Torque 277 ft. lbs.   Min Torque 181   Test Percent Gain in Strength from Initial  104 %   Ice - Z Lie (in min.) 10          Jean Claude participated in neuromuscular re-education activities to improve balance, coordination, proprioception, motor control and/or posture for 10 minutes. The following activities were included:    Both  "knees to chest red ball x20, 3-5 sec ea  PPT x20, 5"  Lumbar stabilization march in place x20  Lumbar stabilization leg extension 2/10         Nikea participated in therapeutic exercises to develop strength, endurance, ROM, flexibility, posture, and core stabilization for 28 minutes including:  Supine legs on red ball, static rest 2 min  Hamstring stretch 3x 30 sec ea  Seated lumbar flexion 5x 5 sec       Peripheral muscle strengthening which included one set of 15-20 repetitions at a slow and controlled 10-13 second per rep pace focused on strengthening supporting musculature in order to improve body mechanics and functional mobility. Patient and therapist focused on proper form during treatment to ensure optimal strengthening of each targeted muscle group.  Machines utilized included:Torso rotation, Leg Ext, Leg Curl, Chest Press, Rowing, Hip Abd, Hip Add, and Leg Press,Triceps and Biceps-No UE machines performed.          Nikea participated in dynamic functional therapeutic activities to improve functional performance and simulate household and community activities for 00  minutes. The following activities were included:      Nikea received manual therapy techniques for 00  minutes. The following activities were included:      Pt given cold pack for 10 minutes to low back and R shoulder in Z-lie.    Patient Education and Home Exercises     Home exercises include:  Rest with legs 90/90 2 min  Bilateral knees to chest with ball if available  PPT  Seated lumbar flexion  Lumbar stabilization march in place  Lumbar stabilization leg extension    Cardio program (V5): -6/10/24  Lifting education (V11): -  Posture/Lumbar roll: instruct visit 1  Fr[employer] Discharge handout (date given): -  Equipment at home/gym membership: no    Education provided:   - PT role and POC  - HEP      Written Home Exercises Provided: Patient instructed to cont prior HEP.  Exercises were reviewed and Jean Claude was able to demonstrate them prior " to the end of the session.  Jean Claude demonstrated good  understanding of the education provided.     See EMR under Patient Instructions for exercises provided prior visit.    Assessment       Reassessment 7/5/24:  Patient has attended 10 visits at [facility] which included MD evaluation, PT evaluation with isometric testing, and physical therapy treatment including HEP instruction, education, aerobic activity, dynamic strengthening on MedX equipment for the spine, and whole body strengthening on MedX equipment with increasing resistance. Patient demonstrates increased ability to reduce symptoms, improve posture, improve ROM, and improve strength, as stated below:    -Improved posture, she is using lumbar roll  -Improved lumbar ROM, 0-33 degrees initially on MedX test and 0-36 degrees currently .  -Improved strength at each test point on lumbar MedX isometric test with 104% average improvement noted with reduced pain noted by patient.  -Initial outcome tool score of 22% and current outcome tool score 6% indicating reduced pain and improved function.        Patient is making good progress towards established goals.  Pt will continue to benefit from skilled outpatient physical therapy to address the deficits stated in the impairment chart, provide pt/family education and to maximize pt's level of independence in the home and community environment.     Anticipated Barriers for therapy: none  Pt's spiritual, cultural and educational needs considered and pt agreeable to plan of care and goals as stated below:     Goals:   Short term goals:  6 weeks or 10 visits   - Pt will demonstrate increased lumbar MedX ROM by at least 3 degrees from the initial ROM value with improvements noted in functional ROM and ability to perform ADLs. Appropriate and Ongoing Met 7/5/24  - Pt will demonstrate increased MedX average isometric strength value by 20% from initial test resulting in improved ability to perform bending, lifting,  and carrying activities safely, confidently. Appropriate and Ongoing Met 7/5/24  - Pt will report a reduction in worst pain score by 1-2 points for improved tolerance for standing/walking, household chores. Appropriate and Ongoing Met 7/5/24  - Pt able to perform HEP correctly with minimal cueing or supervision from therapist to encourage independent management of symptoms. Appropriate and Ongoing Met 7/5/24     Long term goals: 10 weeks or 20 visits   - Pt will demonstrate increased lumbar MedX ROM by at least 6 degrees from initial ROM value, resulting in improved ability to perform functional forward bending while standing and sitting. Appropriate and Ongoing Progressing  - Pt will demonstrate increased MedX average isometric strength value by 30% from initial test resulting in improved ability to perform bending, lifting, and carrying activities safely and confidently. Appropriate and Ongoing Met 7/5/24  - Pt to demonstrate ability to independently control and reduce their pain through posture positioning and mechanical movements throughout a typical day. Appropriate and Ongoing Progressing  - Pt will demonstrate reduced pain and improved functional outcomes as reported on the Oswestry Disability Index by reaching a score of 16% or less in order to demonstrate subjective improvement in pt's condition. . Appropriate and Ongoing Met 7/5/24  Added goal 7/5/24:     Pt will demonstrate increased MedX average isometric strength value by 114% from initial test resulting in improved ability to perform bending, lifting, and carrying activities safely and confidently.       Plan     Continue with established Plan of Care towards established PT goals.     Therapist: Luda Kaur, PT  7/5/2024

## 2024-07-12 ENCOUNTER — CLINICAL SUPPORT (OUTPATIENT)
Dept: REHABILITATION | Facility: HOSPITAL | Age: 44
End: 2024-07-12
Payer: COMMERCIAL

## 2024-07-12 DIAGNOSIS — G89.29 CHRONIC BILATERAL LOW BACK PAIN WITHOUT SCIATICA: Primary | ICD-10-CM

## 2024-07-12 DIAGNOSIS — M54.50 CHRONIC BILATERAL LOW BACK PAIN WITHOUT SCIATICA: Primary | ICD-10-CM

## 2024-07-12 DIAGNOSIS — R29.898 DECREASED STRENGTH OF TRUNK AND BACK: ICD-10-CM

## 2024-07-12 PROCEDURE — 97750 PHYSICAL PERFORMANCE TEST: CPT | Mod: 32,PO

## 2024-07-12 NOTE — PROGRESS NOTES
[facility]  Physical Therapy Treatment Note     Name: Jean Claude Dye  Clinic Number: [id number]    Therapy Diagnosis:   Encounter Diagnoses   Name Primary?    Chronic bilateral low back pain without sciatica Yes    Decreased strength of trunk and back              Physician: Linnea Ayala PA-C    Visit Date: 7/12/2024    Physician Orders: PT Eval and Treat- Healthy Back  Medical Diagnosis from Referral: lumbar back pain  Evaluation Date: 5/8/2024  Authorization Period Expiration: 12/31/24  Plan of Care Expiration: 7/18/2024  Reassessment 7/5/24  Reassessment Due: 20th visit  Visit # / Visits authorized: 10/20 (+1 prior auth)  MedX testing visit 2    PTA Visit #: 1/5     Time In: 7:07 AM  Time Out: 8:00 AM  Total Billable Time: 53 minutes  INSURANCE and OUTCOMES: Program Benefit Group with Lumbar Outcomes (Oswestry and AQoL) 1/3    Precautions: standard    Pattern of pain determined: Pattern 2    Subjective     Jean Claude Dye reports min to no LBP today.  Patient reports tolerating previous visit   without adverse effect.  Patient reports their pain to be 0-1/10 on a 0-10 scale with 0 being no pain and 10 being the worst pain imaginable.  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[place]      Pt goals:  not to feel pain, move like she wants    Objective      Lumbar  Isometric Testing on Med X equipment: Testing administered by PT    Test Initial Baseline Midpoint Final   Date 5/27/24 7/5/24    ROM 0-33 deg 0-36 deg    Max Peak Torque 221  277    Min Peak Torque 92  181    Flex/Ext Ratio 2.4:1 1.5:1    % variance  normative data -9% +87%    % change from initial test N/A visit 1 +104%            Outcomes:  Intake Score: 22% Oswestry, AQoL 11%  Visit 6 Score:   Visit 10 Score:  6% Oswestry  Discharge Score:  Goal Score: 16%  "          Treatment     Jean Claude received the treatments listed below:          Jean Claude was seen for performance testing for spinal musculature  to engage spinal musculature correctly for isometric testing within patient's range of motion, with comparison to baseline testing for  15 minutes.  This includes the MedX practice exercise component and practice and standard testing.  Med x dynamic exercise and testing performed with instructions to guide the patient safely through the isometric testing.  Patient informed to perform isometric test correctly, and safely, building best force they safely can and not pushing through pain.  Patient demonstrated understanding of information         7/12/2024    10:44 AM   [facility]   Visit Number 11   VAS Pain Rating 1   Recumbent Bike Seat Pos. 14   Time 10   Flexion in Lying 20   Flexion in Sitting 10   Lumbar Weight 89 lbs   Repetitions 18   Rating of Perceived Exertion 3      Medical MedX Treatment as follows:    MedX exercise started day 3:  Patient received neuromuscular education for 00  minutes via participation on the Medical MedX Machine. Therapist assisted patient in isolating and engaging spinal stabilization musculature in order to improve functional ability and postural control. Patient performed exercise with therapist guidance in order to accurately use pacer function, avoid valsalva, and optimally exert effort within a safe and effective range via the Arya Exertion Rating Scale. Patient instructed to perform at a midrange of exertion and to complete 15-20 repetitions within appropriate split time, with proper technique, and while maintaining safety.              Jean Claude participated in neuromuscular re-education activities to improve balance, coordination, proprioception, motor control and/or posture for 10 minutes. The following activities were included:    Both knees to chest red ball x20, 3-5 sec ea  PPT x20, 5"  Lumbar stabilization march in place " x20  Lumbar stabilization leg extension 2/10         Nikea participated in therapeutic exercises to develop strength, endurance, ROM, flexibility, posture, and core stabilization for 28 minutes including:  Supine legs on red ball, static rest 2 min  Hamstring stretch 3x 30 sec ea  Seated lumbar flexion 5x 5 sec       Peripheral muscle strengthening which included one set of 15-20 repetitions at a slow and controlled 10-13 second per rep pace focused on strengthening supporting musculature in order to improve body mechanics and functional mobility. Patient and therapist focused on proper form during treatment to ensure optimal strengthening of each targeted muscle group.  Machines utilized included:Torso rotation, Leg Ext, Leg Curl, Chest Press, Rowing, Hip Abd, Hip Add, and Leg Press,Triceps and Biceps          Nikea participated in dynamic functional therapeutic activities to improve functional performance and simulate household and community activities for 00  minutes. The following activities were included:      Nikea received manual therapy techniques for 00  minutes. The following activities were included:      Pt given cold pack for 10 minutes to low back and R shoulder in Z-lie.    Patient Education and Home Exercises     Home exercises include:  Rest with legs 90/90 2 min  Bilateral knees to chest with ball if available  PPT  Seated lumbar flexion  Lumbar stabilization march in place  Lumbar stabilization leg extension    Cardio program (V5): -6/10/24  Lifting education (V11): -  Posture/Lumbar roll: instruct visit 1  Fr[employer] Discharge handout (date given): -  Equipment at home/gym membership: no    Education provided:   - PT role and POC  - HEP      Written Home Exercises Provided: Patient instructed to cont prior HEP.  Exercises were reviewed and Jean Claude was able to demonstrate them prior to the end of the session.  Nikea demonstrated good  understanding of the education provided.     See EMR under  Patient Instructions for exercises provided prior visit.    Assessment   Pt with a progression on lumbar medx without any c/o pain. Pt has not worked her 2 jobs this week so her LB symptoms are low.Otherwise, she continues to be compliant with her HEP.     Reassessment 7/5/24:  Patient has attended 10 visits at [facility] which included MD evaluation, PT evaluation with isometric testing, and physical therapy treatment including HEP instruction, education, aerobic activity, dynamic strengthening on MedX equipment for the spine, and whole body strengthening on MedX equipment with increasing resistance. Patient demonstrates increased ability to reduce symptoms, improve posture, improve ROM, and improve strength, as stated below:    -Improved posture, she is using lumbar roll  -Improved lumbar ROM, 0-33 degrees initially on MedX test and 0-36 degrees currently .  -Improved strength at each test point on lumbar MedX isometric test with 104% average improvement noted with reduced pain noted by patient.  -Initial outcome tool score of 22% and current outcome tool score 6% indicating reduced pain and improved function.        Patient is making good progress towards established goals.  Pt will continue to benefit from skilled outpatient physical therapy to address the deficits stated in the impairment chart, provide pt/family education and to maximize pt's level of independence in the home and community environment.     Anticipated Barriers for therapy: none  Pt's spiritual, cultural and educational needs considered and pt agreeable to plan of care and goals as stated below:     Goals:   Short term goals:  6 weeks or 10 visits   - Pt will demonstrate increased lumbar MedX ROM by at least 3 degrees from the initial ROM value with improvements noted in functional ROM and ability to perform ADLs. Appropriate and Ongoing Met 7/5/24  - Pt will demonstrate increased MedX average isometric strength value by 20% from  initial test resulting in improved ability to perform bending, lifting, and carrying activities safely, confidently. Appropriate and Ongoing Met 7/5/24  - Pt will report a reduction in worst pain score by 1-2 points for improved tolerance for standing/walking, household chores. Appropriate and Ongoing Met 7/5/24  - Pt able to perform HEP correctly with minimal cueing or supervision from therapist to encourage independent management of symptoms. Appropriate and Ongoing Met 7/5/24     Long term goals: 10 weeks or 20 visits   - Pt will demonstrate increased lumbar MedX ROM by at least 6 degrees from initial ROM value, resulting in improved ability to perform functional forward bending while standing and sitting. Appropriate and Ongoing Progressing  - Pt will demonstrate increased MedX average isometric strength value by 30% from initial test resulting in improved ability to perform bending, lifting, and carrying activities safely and confidently. Appropriate and Ongoing Met 7/5/24  - Pt to demonstrate ability to independently control and reduce their pain through posture positioning and mechanical movements throughout a typical day. Appropriate and Ongoing Progressing  - Pt will demonstrate reduced pain and improved functional outcomes as reported on the Oswestry Disability Index by reaching a score of 16% or less in order to demonstrate subjective improvement in pt's condition. . Appropriate and Ongoing Met 7/5/24  Added goal 7/5/24:     Pt will demonstrate increased MedX average isometric strength value by 114% from initial test resulting in improved ability to perform bending, lifting, and carrying activities safely and confidently.       Plan     Continue with established Plan of Care towards established PT goals.     Therapist: Carolina Boyd, PTA  7/12/2024

## 2024-07-24 ENCOUNTER — CLINICAL SUPPORT (OUTPATIENT)
Dept: REHABILITATION | Facility: HOSPITAL | Age: 44
End: 2024-07-24
Payer: COMMERCIAL

## 2024-07-24 DIAGNOSIS — G89.29 CHRONIC BILATERAL LOW BACK PAIN WITHOUT SCIATICA: Primary | ICD-10-CM

## 2024-07-24 DIAGNOSIS — R29.898 DECREASED STRENGTH OF TRUNK AND BACK: ICD-10-CM

## 2024-07-24 DIAGNOSIS — M54.50 CHRONIC BILATERAL LOW BACK PAIN WITHOUT SCIATICA: Primary | ICD-10-CM

## 2024-07-24 PROCEDURE — 97750 PHYSICAL PERFORMANCE TEST: CPT | Mod: 32,PO | Performed by: PHYSICAL THERAPIST

## 2024-07-24 NOTE — PROGRESS NOTES
[facility]  Physical Therapy Treatment Note     Name: Jean Claude Dye  Clinic Number: [id number]    Therapy Diagnosis:   Encounter Diagnoses   Name Primary?    Chronic bilateral low back pain without sciatica Yes    Decreased strength of trunk and back              Physician: Linnea Ayala PA-C    Visit Date: 7/24/2024    Physician Orders: PT Eval and Treat- [facility]  Medical Diagnosis from Referral: lumbar back pain  Evaluation Date: 5/8/2024  Authorization Period Expiration: 12/31/24  Plan of Care Expiration: 7/18/2024  Reassessment 7/5/24  Reassessment Due: 20th visit  Visit # / Visits authorized: 11/20 (+1 prior auth)  MedX testing visit 2    PTA Visit #: 0/5     Time In: 2:07PM  Time Out: 3:15PM  Total Billable Time: 58 minutes  INSURANCE and OUTCOMES: Program Benefit Group with Lumbar Outcomes (Oswestry and AQoL) 2/3    Precautions: standard    Pattern of pain determined: Pattern 2    Subjective     Jean Claude Dye reports she is feeling good and without pain today.    Patient reports tolerating previous visit   without adverse effect.  Patient reports their pain to be 0/10 on a 0-10 scale with 0 being no pain and 10 being the worst pain imaginable.  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[place]      Pt goals:  not to feel pain, move like she wants    Objective      Lumbar  Isometric Testing on Med X equipment: Testing administered by PT    Test Initial Baseline Midpoint Final   Date 5/27/24 7/5/24    ROM 0-33 deg 0-36 deg    Max Peak Torque 221  277    Min Peak Torque 92  181    Flex/Ext Ratio 2.4:1 1.5:1    % variance  normative data -9% +87%    % change from initial test N/A visit 1 +104%            Outcomes:  Intake Score: 22% Oswestry, AQoL 11%  Visit 10 Score:  6% Oswestry  Discharge Score:  Goal Score: 16%  "          Treatment     Jean Claude received the treatments listed below:      Medical MedX Treatment as follows:    MedX exercise started day 3:  Patient received neuromuscular education for 15  minutes via participation on the Medical MedX Machine. Therapist assisted patient in isolating and engaging spinal stabilization musculature in order to improve functional ability and postural control. Patient performed exercise with therapist guidance in order to accurately use pacer function, avoid valsalva, and optimally exert effort within a safe and effective range via the Arya Exertion Rating Scale. Patient instructed to perform at a midrange of exertion and to complete 15-20 repetitions within appropriate split time, with proper technique, and while maintaining safety.           7/24/2024     2:43 PM   [facility]   Visit Number 12   VAS Pain Rating 0   Recumbent Bike Seat Pos. 14   Time 10   Flexion in Lying 20   Flexion in Sitting 10   Lumbar Weight 89 lbs   Repetitions 20   Rating of Perceived Exertion 3   Ice - Z Lie (in min.) 10        Jean Claude participated in neuromuscular re-education activities to improve balance, coordination, proprioception, motor control and/or posture for 10 minutes. The following activities were included:    Both knees to chest red ball x20, 3-5 sec ea  PPT x20, 5"  Lumbar stabilization march in place x20  Lumbar stabilization leg extension 2/10       Jean Claude participated in therapeutic exercises to develop strength, endurance, ROM, flexibility, posture, and core stabilization for 33 minutes including:  Supine legs on red ball, static rest 2 min  Hamstring stretch 3x 30 sec ea  Seated lumbar flexion 5x 5 sec       Peripheral muscle strengthening which included one set of 15-20 repetitions at a slow and controlled 10-13 second per rep pace focused on strengthening supporting musculature in order to improve body mechanics and functional mobility. Patient and therapist focused on proper form " during treatment to ensure optimal strengthening of each targeted muscle group.  Machines utilized included:Torso rotation, Leg Ext, Leg Curl, Chest Press, Rowing, Hip Abd, Hip Add, and Leg Press,Triceps and Biceps      Jean Claude participated in dynamic functional therapeutic activities to improve functional performance and simulate household and community activities for 00  minutes. The following activities were included:      Jean Claude received manual therapy techniques for 00  minutes. The following activities were included:      Pt given cold pack for 10 minutes to low back and R shoulder in Z-lie.    Patient Education and Home Exercises     Home exercises include:  Rest with legs 90/90 2 min  Bilateral knees to chest with ball if available  PPT  Seated lumbar flexion  Lumbar stabilization march in place  Lumbar stabilization leg extension    Cardio program (V5): -6/10/24  Lifting education (V11): - 7/24/24  Posture/Lumbar roll: instruct visit 1  [employer] Discharge handout (date given): -  Equipment at home/gym membership: no    Education provided:   - PT role and POC  - HEP      Written Home Exercises Provided: Patient instructed to cont prior HEP.  Exercises were reviewed and Jean Claude was able to demonstrate them prior to the end of the session.  Jean Claude demonstrated good  understanding of the education provided.     See EMR under Patient Instructions for exercises provided prior visit.    Assessment     Pt aware of decreased pain overall. She is demonstrating improved lumbar stabilization with exercise and activities. Increased ROM with torso rotation as well. Instructed in Do's and Don'ts of bending and lifting. Pt given handout for reference.    Reassessment 7/5/24:  Patient has attended 10 visits at [facility] which included MD evaluation, PT evaluation with isometric testing, and physical therapy treatment including HEP instruction, education, aerobic activity, dynamic strengthening on MedX equipment for  the spine, and whole body strengthening on MedX equipment with increasing resistance. Patient demonstrates increased ability to reduce symptoms, improve posture, improve ROM, and improve strength, as stated below:    -Improved posture, she is using lumbar roll  -Improved lumbar ROM, 0-33 degrees initially on MedX test and 0-36 degrees currently .  -Improved strength at each test point on lumbar MedX isometric test with 104% average improvement noted with reduced pain noted by patient.  -Initial outcome tool score of 22% and current outcome tool score 6% indicating reduced pain and improved function.        Patient is making good progress towards established goals.  Pt will continue to benefit from skilled outpatient physical therapy to address the deficits stated in the impairment chart, provide pt/family education and to maximize pt's level of independence in the home and community environment.     Anticipated Barriers for therapy: none  Pt's spiritual, cultural and educational needs considered and pt agreeable to plan of care and goals as stated below:     Goals:   Short term goals:  6 weeks or 10 visits   - Pt will demonstrate increased lumbar MedX ROM by at least 3 degrees from the initial ROM value with improvements noted in functional ROM and ability to perform ADLs. Appropriate and Ongoing Met 7/5/24  - Pt will demonstrate increased MedX average isometric strength value by 20% from initial test resulting in improved ability to perform bending, lifting, and carrying activities safely, confidently. Appropriate and Ongoing Met 7/5/24  - Pt will report a reduction in worst pain score by 1-2 points for improved tolerance for standing/walking, household chores. Appropriate and Ongoing Met 7/5/24  - Pt able to perform HEP correctly with minimal cueing or supervision from therapist to encourage independent management of symptoms. Appropriate and Ongoing Met 7/5/24     Long term goals: 10 weeks or 20 visits   - Pt  will demonstrate increased lumbar MedX ROM by at least 6 degrees from initial ROM value, resulting in improved ability to perform functional forward bending while standing and sitting. Appropriate and Ongoing Progressing  - Pt will demonstrate increased MedX average isometric strength value by 30% from initial test resulting in improved ability to perform bending, lifting, and carrying activities safely and confidently. Appropriate and Ongoing Met 7/5/24  - Pt to demonstrate ability to independently control and reduce their pain through posture positioning and mechanical movements throughout a typical day. Appropriate and Ongoing Progressing  - Pt will demonstrate reduced pain and improved functional outcomes as reported on the Oswestry Disability Index by reaching a score of 16% or less in order to demonstrate subjective improvement in pt's condition. . Appropriate and Ongoing Met 7/5/24  Added goal 7/5/24:     Pt will demonstrate increased MedX average isometric strength value by 114% from initial test resulting in improved ability to perform bending, lifting, and carrying activities safely and confidently.       Plan     Continue with established Plan of Care towards established PT goals.     Therapist: Luda Kaur, PT  7/24/2024

## 2024-07-26 ENCOUNTER — CLINICAL SUPPORT (OUTPATIENT)
Dept: REHABILITATION | Facility: HOSPITAL | Age: 44
End: 2024-07-26
Payer: COMMERCIAL

## 2024-07-26 DIAGNOSIS — R29.898 DECREASED STRENGTH OF TRUNK AND BACK: ICD-10-CM

## 2024-07-26 DIAGNOSIS — G89.29 CHRONIC BILATERAL LOW BACK PAIN WITHOUT SCIATICA: Primary | ICD-10-CM

## 2024-07-26 DIAGNOSIS — M54.50 CHRONIC BILATERAL LOW BACK PAIN WITHOUT SCIATICA: Primary | ICD-10-CM

## 2024-07-26 PROCEDURE — 97750 PHYSICAL PERFORMANCE TEST: CPT | Mod: 32,PO

## 2024-07-26 NOTE — PROGRESS NOTES
[facility]  Physical Therapy Treatment Note     Name: Jean Claude Dye  Clinic Number: [id number]    Therapy Diagnosis:   Encounter Diagnoses   Name Primary?    Chronic bilateral low back pain without sciatica Yes    Decreased strength of trunk and back          Physician: Linnea Ayala PA-C    Visit Date: 7/26/2024    Physician Orders: PT Eval and Treat- Healthy Back  Medical Diagnosis from Referral: lumbar back pain  Evaluation Date: 5/8/2024  Authorization Period Expiration: 12/31/24  Plan of Care Expiration: 7/18/2024  Reassessment 7/5/24  Reassessment Due: 20th visit  Visit # / Visits authorized: 12/20 (+1 prior auth)  MedX testing visit 2    PTA Visit #: 1/5     Time In: 7:00PM  Time Out: 8:10PM  Total Billable Time: 60 minutes  INSURANCE and OUTCOMES: Program Benefit Group with Lumbar Outcomes (Oswestry and AQoL) 2/3    Precautions: standard    Pattern of pain determined: Pattern 2    Subjective     Jean Claude Dye reports she is feeling good and without pain today. She was not been working her second job which helped to reduce her symptoms.     Patient reports tolerating previous visit   without adverse effect.  Patient reports their pain to be 0/10 on a 0-10 scale with 0 being no pain and 10 being the worst pain imaginable.  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[place]      Pt goals:  not to feel pain, move like she wants    Objective      Lumbar  Isometric Testing on Med X equipment: Testing administered by PT    Test Initial Baseline Midpoint Final   Date 5/27/24 7/5/24    ROM 0-33 deg 0-36 deg    Max Peak Torque 221  277    Min Peak Torque 92  181    Flex/Ext Ratio 2.4:1 1.5:1    % variance  normative data -9% +87%    % change from initial test N/A visit 1 +104%            Outcomes:  Intake Score: 22% Oswestry, AQoL  "11%  Visit 10 Score:  6% Oswestry  Discharge Score:  Goal Score: 16%           Treatment     Jean Claude received the treatments listed below:      Medical MedX Treatment as follows:    MedX exercise started day 3:  Patient received neuromuscular education for 15  minutes via participation on the Medical MedX Machine. Therapist assisted patient in isolating and engaging spinal stabilization musculature in order to improve functional ability and postural control. Patient performed exercise with therapist guidance in order to accurately use pacer function, avoid valsalva, and optimally exert effort within a safe and effective range via the Arya Exertion Rating Scale. Patient instructed to perform at a midrange of exertion and to complete 15-20 repetitions within appropriate split time, with proper technique, and while maintaining safety.           7/26/2024     7:18 AM   [facility]   Visit Number 13   VAS Pain Rating 0   Recumbent Bike Seat Pos. 14   Time 10   Flexion in Lying 20   Flexion in Sitting 10   Lumbar Weight 92 lbs   Repetitions 15   Ice - Z Lie (in min.) 10         Nikea participated in neuromuscular re-education activities to improve balance, coordination, proprioception, motor control and/or posture for 10 minutes. The following activities were included:    Both knees to chest red ball x20, 3-5 sec ea  PPT x20, 5"  Lumbar stabilization march in place x20  Lumbar stabilization leg extension 2/10       Nikea participated in therapeutic exercises to develop strength, endurance, ROM, flexibility, posture, and core stabilization for 35 minutes including:  Supine legs on red ball, static rest 2 min  Hamstring stretch 3x 30 sec ea  Seated lumbar flexion 5x 5 sec       Peripheral muscle strengthening which included one set of 15-20 repetitions at a slow and controlled 10-13 second per rep pace focused on strengthening supporting musculature in order to improve body mechanics and functional mobility. Patient " and therapist focused on proper form during treatment to ensure optimal strengthening of each targeted muscle group.  Machines utilized included:Torso rotation, Leg Ext, Leg Curl, Chest Press, Rowing, Hip Abd, Hip Add, and Leg Press,Triceps and Biceps      Jean Claude participated in dynamic functional therapeutic activities to improve functional performance and simulate household and community activities for 00  minutes. The following activities were included:      Jean Claude received manual therapy techniques for 00  minutes. The following activities were included:      Pt given cold pack for 00 minutes to low back and R shoulder in Z-lie.    Patient Education and Home Exercises     Home exercises include:  Rest with legs 90/90 2 min  Bilateral knees to chest with ball if available  PPT  Seated lumbar flexion  Lumbar stabilization march in place  Lumbar stabilization leg extension    Cardio program (V5): -6/10/24  Lifting education (V11): - 7/24/24  Posture/Lumbar roll: instruct visit 1  [employer] Discharge handout (date given): -  Equipment at home/gym membership: no    Education provided:   - PT role and POC  - HEP      Written Home Exercises Provided: Patient instructed to cont prior HEP.  Exercises were reviewed and Jean Claude was able to demonstrate them prior to the end of the session.  Jean Claude demonstrated good  understanding of the education provided.     See EMR under Patient Instructions for exercises provided prior visit.    Assessment     Pt w/o adverse on with session. She was able to progress with weight on lumbar machine with appropriate RPE. She stated she stretches when she has pain, but also encouraged her to stretch also when not having pain. Overall, reduction of LBP with decreased her work hrs and in combination with HEP. No c/o shoulder pain today.    Reassessment 7/5/24:  Patient has attended 10 visits at [facility] which included MD evaluation, PT evaluation with isometric testing, and physical  therapy treatment including HEP instruction, education, aerobic activity, dynamic strengthening on MedX equipment for the spine, and whole body strengthening on MedX equipment with increasing resistance. Patient demonstrates increased ability to reduce symptoms, improve posture, improve ROM, and improve strength, as stated below:    -Improved posture, she is using lumbar roll  -Improved lumbar ROM, 0-33 degrees initially on MedX test and 0-36 degrees currently .  -Improved strength at each test point on lumbar MedX isometric test with 104% average improvement noted with reduced pain noted by patient.  -Initial outcome tool score of 22% and current outcome tool score 6% indicating reduced pain and improved function.        Patient is making good progress towards established goals.  Pt will continue to benefit from skilled outpatient physical therapy to address the deficits stated in the impairment chart, provide pt/family education and to maximize pt's level of independence in the home and community environment.     Anticipated Barriers for therapy: none  Pt's spiritual, cultural and educational needs considered and pt agreeable to plan of care and goals as stated below:     Goals:   Short term goals:  6 weeks or 10 visits   - Pt will demonstrate increased lumbar MedX ROM by at least 3 degrees from the initial ROM value with improvements noted in functional ROM and ability to perform ADLs. Appropriate and Ongoing Met 7/5/24  - Pt will demonstrate increased MedX average isometric strength value by 20% from initial test resulting in improved ability to perform bending, lifting, and carrying activities safely, confidently. Appropriate and Ongoing Met 7/5/24  - Pt will report a reduction in worst pain score by 1-2 points for improved tolerance for standing/walking, household chores. Appropriate and Ongoing Met 7/5/24  - Pt able to perform HEP correctly with minimal cueing or supervision from therapist to encourage  independent management of symptoms. Appropriate and Ongoing Met 7/5/24     Long term goals: 10 weeks or 20 visits   - Pt will demonstrate increased lumbar MedX ROM by at least 6 degrees from initial ROM value, resulting in improved ability to perform functional forward bending while standing and sitting. Appropriate and Ongoing Progressing  - Pt will demonstrate increased MedX average isometric strength value by 30% from initial test resulting in improved ability to perform bending, lifting, and carrying activities safely and confidently. Appropriate and Ongoing Met 7/5/24  - Pt to demonstrate ability to independently control and reduce their pain through posture positioning and mechanical movements throughout a typical day. Appropriate and Ongoing Progressing  - Pt will demonstrate reduced pain and improved functional outcomes as reported on the Oswestry Disability Index by reaching a score of 16% or less in order to demonstrate subjective improvement in pt's condition. . Appropriate and Ongoing Met 7/5/24  Added goal 7/5/24:     Pt will demonstrate increased MedX average isometric strength value by 114% from initial test resulting in improved ability to perform bending, lifting, and carrying activities safely and confidently.       Plan     Continue with established Plan of Care towards established PT goals.     Therapist: Carolina Boyd, PTA  7/26/2024

## 2024-08-23 ENCOUNTER — CLINICAL SUPPORT (OUTPATIENT)
Dept: REHABILITATION | Facility: HOSPITAL | Age: 44
End: 2024-08-23
Payer: COMMERCIAL

## 2024-08-23 DIAGNOSIS — R29.898 DECREASED STRENGTH OF TRUNK AND BACK: ICD-10-CM

## 2024-08-23 DIAGNOSIS — G89.29 CHRONIC BILATERAL LOW BACK PAIN WITHOUT SCIATICA: Primary | ICD-10-CM

## 2024-08-23 DIAGNOSIS — M54.50 CHRONIC BILATERAL LOW BACK PAIN WITHOUT SCIATICA: Primary | ICD-10-CM

## 2024-08-23 PROCEDURE — 97750 PHYSICAL PERFORMANCE TEST: CPT | Mod: 32,PO | Performed by: PHYSICAL THERAPIST

## 2024-08-23 NOTE — PROGRESS NOTES
[facility]  Physical Therapy Treatment Note     Name: Jean Claude Dye  Clinic Number: [id number]    Therapy Diagnosis:   Encounter Diagnoses   Name Primary?    Chronic bilateral low back pain without sciatica Yes    Decreased strength of trunk and back          Physician: Linnea Ayala PA-C    Visit Date: 8/23/2024    Physician Orders: PT Eval and Treat- Healthy Back  Medical Diagnosis from Referral: lumbar back pain  Evaluation Date: 5/8/2024  Authorization Period Expiration: 12/31/24  Plan of Care Expiration: 7/18/2024  Reassessment 7/5/24  Reassessment Due: 20th visit  Visit # / Visits authorized: 13/20 (+1 prior auth)  MedX testing visit 2    PTA Visit #: 0/5     Time In: 6:50AM  Time Out: 7:45AM  Total Billable Time: 55 minutes  INSURANCE and OUTCOMES: Program Benefit Group with Lumbar Outcomes (Oswestry and AQoL) 2/3    Precautions: standard    Pattern of pain determined: Pattern 2    Subjective     Jean Claude Dye reports she has been unable to come to PT secondary to work, illness, caring for grandmother, etc. She and her  had Covid.    Patient reports tolerating previous visit   without adverse effect.  Patient reports their pain to be 1/10 on a 0-10 scale with 0 being no pain and 10 being the worst pain imaginable.  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[place]      Pt goals:  not to feel pain, move like she wants    Objective      Lumbar  Isometric Testing on Med X equipment: Testing administered by PT    Test Initial Baseline Midpoint Final   Date 5/27/24 7/5/24    ROM 0-33 deg 0-36 deg    Max Peak Torque 221  277    Min Peak Torque 92  181    Flex/Ext Ratio 2.4:1 1.5:1    % variance  normative data -9% +87%    % change from initial test N/A visit 1 +104%            Outcomes:  Intake Score: 22% Oswestry, AQoL  "11%  Visit 10 Score:  6% Oswestry  Discharge Score:  Goal Score: 16%           Treatment     Jean Claude received the treatments listed below:      Medical MedX Treatment as follows:    MedX exercise started day 3:  Patient received neuromuscular education for 15  minutes via participation on the Medical MedX Machine. Therapist assisted patient in isolating and engaging spinal stabilization musculature in order to improve functional ability and postural control. Patient performed exercise with therapist guidance in order to accurately use pacer function, avoid valsalva, and optimally exert effort within a safe and effective range via the Arya Exertion Rating Scale. Patient instructed to perform at a midrange of exertion and to complete 15-20 repetitions within appropriate split time, with proper technique, and while maintaining safety.           8/23/2024     7:13 AM   [facility]   Visit Number 14   VAS Pain Rating 1   Recumbent Bike Seat Pos. 14   Time 10   Flexion in Lying 20   Flexion in Sitting 10   Lumbar Weight 92 lbs   Repetitions 15   Rating of Perceived Exertion 3   Ice - Z Lie (in min.) 10           [place] participated in neuromuscular re-education activities to improve balance, coordination, proprioception, motor control and/or posture for 10 minutes. The following activities were included:    Both knees to chest red ball x20, 3-5 sec ea  PPT x20, 5"  Lumbar stabilization march in place x20  Lumbar stabilization leg extension 2/10       [place] participated in therapeutic exercises to develop strength, endurance, ROM, flexibility, posture, and core stabilization for 30 minutes including:  Supine legs on red ball, static rest 2 min  Hamstring stretch 3x 30 sec ea  Seated lumbar flexion 5x 5 sec       Peripheral muscle strengthening which included one set of 15-20 repetitions at a slow and controlled 10-13 second per rep pace focused on strengthening supporting musculature in order to improve body mechanics " and functional mobility. Patient and therapist focused on proper form during treatment to ensure optimal strengthening of each targeted muscle group.  Machines utilized included:Torso rotation, Leg Ext, Leg Curl, Chest Press, Rowing, Hip Abd, Hip Add, and Leg Press,Triceps and Biceps      Jean Claude participated in dynamic functional therapeutic activities to improve functional performance and simulate household and community activities for 00  minutes. The following activities were included:      Jean Claude received manual therapy techniques for 00  minutes. The following activities were included:      Pt given cold pack for 10 minutes to low back and R shoulder in Z-lie.    Patient Education and Home Exercises     Home exercises include:  Rest with legs 90/90 2 min  Bilateral knees to chest with ball if available  PPT  Seated lumbar flexion  Lumbar stabilization march in place  Lumbar stabilization leg extension    Cardio program (V5): -6/10/24  Lifting education (V11): - 7/24/24  Posture/Lumbar roll: instruct visit 1  [employer] Discharge handout (date given): -  Equipment at home/gym membership: no    Education provided:   - PT role and POC  - HEP      Written Home Exercises Provided: Patient instructed to cont prior HEP.  Exercises were reviewed and Jean Claude was able to demonstrate them prior to the end of the session.  Jean Claude demonstrated good  understanding of the education provided.     See EMR under Patient Instructions for exercises provided prior visit.    Assessment     Pt has had a lot going on in her life requiring her attention and she has been unable to come to PT. Her back pain has been minimal. Resumed previous level of resistance with exercises and neuromuscular activities since pt has not been able to attend for a few weeks. She was able to resume all activities with low perceived exertion. She reports being generally fatigued mentally , emotionally and physically and has not had adequate  sleep.    Reassessment 7/5/24:  Patient has attended 10 visits at [facility] which included MD evaluation, PT evaluation with isometric testing, and physical therapy treatment including HEP instruction, education, aerobic activity, dynamic strengthening on MedX equipment for the spine, and whole body strengthening on MedX equipment with increasing resistance. Patient demonstrates increased ability to reduce symptoms, improve posture, improve ROM, and improve strength, as stated below:    -Improved posture, she is using lumbar roll  -Improved lumbar ROM, 0-33 degrees initially on MedX test and 0-36 degrees currently .  -Improved strength at each test point on lumbar MedX isometric test with 104% average improvement noted with reduced pain noted by patient.  -Initial outcome tool score of 22% and current outcome tool score 6% indicating reduced pain and improved function.        Patient is making good progress towards established goals.  Pt will continue to benefit from skilled outpatient physical therapy to address the deficits stated in the impairment chart, provide pt/family education and to maximize pt's level of independence in the home and community environment.     Anticipated Barriers for therapy: none  Pt's spiritual, cultural and educational needs considered and pt agreeable to plan of care and goals as stated below:     Goals:   Short term goals:  6 weeks or 10 visits   - Pt will demonstrate increased lumbar MedX ROM by at least 3 degrees from the initial ROM value with improvements noted in functional ROM and ability to perform ADLs. Appropriate and Ongoing Met 7/5/24  - Pt will demonstrate increased MedX average isometric strength value by 20% from initial test resulting in improved ability to perform bending, lifting, and carrying activities safely, confidently. Appropriate and Ongoing Met 7/5/24  - Pt will report a reduction in worst pain score by 1-2 points for improved tolerance for  standing/walking, household chores. Appropriate and Ongoing Met 7/5/24  - Pt able to perform HEP correctly with minimal cueing or supervision from therapist to encourage independent management of symptoms. Appropriate and Ongoing Met 7/5/24     Long term goals: 10 weeks or 20 visits   - Pt will demonstrate increased lumbar MedX ROM by at least 6 degrees from initial ROM value, resulting in improved ability to perform functional forward bending while standing and sitting. Appropriate and Ongoing Progressing  - Pt will demonstrate increased MedX average isometric strength value by 30% from initial test resulting in improved ability to perform bending, lifting, and carrying activities safely and confidently. Appropriate and Ongoing Met 7/5/24  - Pt to demonstrate ability to independently control and reduce their pain through posture positioning and mechanical movements throughout a typical day. Appropriate and Ongoing Progressing  - Pt will demonstrate reduced pain and improved functional outcomes as reported on the Oswestry Disability Index by reaching a score of 16% or less in order to demonstrate subjective improvement in pt's condition. . Appropriate and Ongoing Met 7/5/24  Added goal 7/5/24:     Pt will demonstrate increased MedX average isometric strength value by 114% from initial test resulting in improved ability to perform bending, lifting, and carrying activities safely and confidently.       Plan     Continue with established Plan of Care towards established PT goals.     Therapist: Luda Kaur, PT  8/23/2024

## 2024-08-29 ENCOUNTER — CLINICAL SUPPORT (OUTPATIENT)
Dept: REHABILITATION | Facility: HOSPITAL | Age: 44
End: 2024-08-29
Payer: COMMERCIAL

## 2024-08-29 DIAGNOSIS — M54.50 CHRONIC BILATERAL LOW BACK PAIN WITHOUT SCIATICA: Primary | ICD-10-CM

## 2024-08-29 DIAGNOSIS — G89.29 CHRONIC BILATERAL LOW BACK PAIN WITHOUT SCIATICA: Primary | ICD-10-CM

## 2024-08-29 DIAGNOSIS — R29.898 DECREASED STRENGTH OF TRUNK AND BACK: ICD-10-CM

## 2024-08-29 PROCEDURE — 97750 PHYSICAL PERFORMANCE TEST: CPT | Mod: 32,PO

## 2024-08-29 NOTE — PROGRESS NOTES
[facility]  Physical Therapy Treatment Note     Name: Jean Claude Dye  Clinic Number: [id number]    Therapy Diagnosis:   Encounter Diagnoses   Name Primary?    Chronic bilateral low back pain without sciatica Yes    Decreased strength of trunk and back          Physician: Linnea Ayala PA-C    Visit Date: 8/29/2024    Physician Orders: PT Eval and Treat- Healthy Back  Medical Diagnosis from Referral: lumbar back pain  Evaluation Date: 5/8/2024  Authorization Period Expiration: 12/31/24  Plan of Care Expiration: 7/18/2024  Reassessment 7/5/24  Reassessment Due: 20th visit  Visit # / Visits authorized: 14/20 (+1 prior auth)  MedX testing visit 2    PTA Visit #: 0/5     Time In: 5:00 PM  Time Out: 6:00 PM  Total Billable Time: 55 minutes  INSURANCE and OUTCOMES: Program Benefit Group with Lumbar Outcomes (Oswestry and AQoL) 2/3    Precautions: standard    Pattern of pain determined: Pattern 2    Subjective     Jean Claude Dye reports she having very little back pn today, 1/10.    Patient reports tolerating previous visit   without adverse effect.  Patient reports their pain to be 1/10 on a 0-10 scale with 0 being no pain and 10 being the worst pain imaginable.  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[place]      Pt goals:  not to feel pain, move like she wants    Objective      Lumbar  Isometric Testing on Med X equipment: Testing administered by PT    Test Initial Baseline Midpoint Final   Date 5/27/24 7/5/24    ROM 0-33 deg 0-36 deg    Max Peak Torque 221  277    Min Peak Torque 92  181    Flex/Ext Ratio 2.4:1 1.5:1    % variance  normative data -9% +87%    % change from initial test N/A visit 1 +104%            Outcomes:  Intake Score: 22% Oswestry, AQoL 11%  Visit 10 Score:  6% Oswestry  Discharge Score:  Goal Score: 16%  "          Treatment     Jean Claude received the treatments listed below:      Medical MedX Treatment as follows:    MedX exercise started day 3:  Patient received neuromuscular education for 15  minutes via participation on the Medical MedX Machine. Therapist assisted patient in isolating and engaging spinal stabilization musculature in order to improve functional ability and postural control. Patient performed exercise with therapist guidance in order to accurately use pacer function, avoid valsalva, and optimally exert effort within a safe and effective range via the Arya Exertion Rating Scale. Patient instructed to perform at a midrange of exertion and to complete 15-20 repetitions within appropriate split time, with proper technique, and while maintaining safety.           8/29/2024     5:46 PM   [facility]   Visit Number 15   VAS Pain Rating 1   Recumbent Bike Seat Pos. 14   Time 10   Flexion in Lying 20   Flexion in Sitting 10   Lumbar Weight 92 lbs   Repetitions 20   Rating of Perceived Exertion 2   Ice - Z Lie (in min.) 10             Jean Claude participated in neuromuscular re-education activities to improve balance, coordination, proprioception, motor control and/or posture for 10 minutes. The following activities were included:    Both knees to chest red ball x20, 3-5 sec ea  PPT x20, 5"  Lumbar stabilization march in place x20  Lumbar stabilization leg extension 2/10       Jean Claude participated in therapeutic exercises to develop strength, endurance, ROM, flexibility, posture, and core stabilization for 30 minutes including:  Supine legs on red ball, static rest 2 min  Hamstring stretch 3x 30 sec ea  Seated lumbar flexion 5x 5 sec       Peripheral muscle strengthening which included one set of 15-20 repetitions at a slow and controlled 10-13 second per rep pace focused on strengthening supporting musculature in order to improve body mechanics and functional mobility. Patient and therapist focused on proper " form during treatment to ensure optimal strengthening of each targeted muscle group.  Machines utilized included:Torso rotation, Leg Ext, Leg Curl, Chest Press, Rowing, Hip Abd, Hip Add, and Leg Press,Triceps and Biceps      Jean Claude participated in dynamic functional therapeutic activities to improve functional performance and simulate household and community activities for 00  minutes. The following activities were included:      Jean Claude received manual therapy techniques for 00  minutes. The following activities were included:      Pt given cold pack for 10 minutes to low back and R shoulder in Z-lie.    Patient Education and Home Exercises     Home exercises include:  Rest with legs 90/90 2 min  Bilateral knees to chest with ball if available  PPT  Seated lumbar flexion  Lumbar stabilization march in place  Lumbar stabilization leg extension    Cardio program (V5): -6/10/24  Lifting education (V11): - 7/24/24  Posture/Lumbar roll: instruct visit 1  [employer] Discharge handout (date given): -  Equipment at home/gym membership: no    Education provided:   - PT role and POC  - HEP      Written Home Exercises Provided: Patient instructed to cont prior HEP.  Exercises were reviewed and Jean Claude was able to demonstrate them prior to the end of the session.  Jean Claude demonstrated good  understanding of the education provided.     See EMR under Patient Instructions for exercises provided prior visit.    Assessment     Her back pain has been minimal. She milena today's tx with progression of ther ex and neuromuscular re-ed well. She was able to progress with reps on Med X with a low PE. She tolerated progression of resistance/reps on peripheral machines with low PE throughout. She demonstrates good posture with exs.   Reassessment 7/5/24:  Patient has attended 10 visits at [facility] which included MD evaluation, PT evaluation with isometric testing, and physical therapy treatment including HEP instruction, education,  aerobic activity, dynamic strengthening on MedX equipment for the spine, and whole body strengthening on MedX equipment with increasing resistance. Patient demonstrates increased ability to reduce symptoms, improve posture, improve ROM, and improve strength, as stated below:    -Improved posture, she is using lumbar roll  -Improved lumbar ROM, 0-33 degrees initially on MedX test and 0-36 degrees currently .  -Improved strength at each test point on lumbar MedX isometric test with 104% average improvement noted with reduced pain noted by patient.  -Initial outcome tool score of 22% and current outcome tool score 6% indicating reduced pain and improved function.        Patient is making good progress towards established goals.  Pt will continue to benefit from skilled outpatient physical therapy to address the deficits stated in the impairment chart, provide pt/family education and to maximize pt's level of independence in the home and community environment.     Anticipated Barriers for therapy: none  Pt's spiritual, cultural and educational needs considered and pt agreeable to plan of care and goals as stated below:     Goals:   Short term goals:  6 weeks or 10 visits   - Pt will demonstrate increased lumbar MedX ROM by at least 3 degrees from the initial ROM value with improvements noted in functional ROM and ability to perform ADLs. Appropriate and Ongoing Met 7/5/24  - Pt will demonstrate increased MedX average isometric strength value by 20% from initial test resulting in improved ability to perform bending, lifting, and carrying activities safely, confidently. Appropriate and Ongoing Met 7/5/24  - Pt will report a reduction in worst pain score by 1-2 points for improved tolerance for standing/walking, household chores. Appropriate and Ongoing Met 7/5/24  - Pt able to perform HEP correctly with minimal cueing or supervision from therapist to encourage independent management of symptoms. Appropriate and Ongoing  Met 7/5/24     Long term goals: 10 weeks or 20 visits   - Pt will demonstrate increased lumbar MedX ROM by at least 6 degrees from initial ROM value, resulting in improved ability to perform functional forward bending while standing and sitting. Appropriate and Ongoing Progressing  - Pt will demonstrate increased MedX average isometric strength value by 30% from initial test resulting in improved ability to perform bending, lifting, and carrying activities safely and confidently. Appropriate and Ongoing Met 7/5/24  - Pt to demonstrate ability to independently control and reduce their pain through posture positioning and mechanical movements throughout a typical day. Appropriate and Ongoing Progressing  - Pt will demonstrate reduced pain and improved functional outcomes as reported on the Oswestry Disability Index by reaching a score of 16% or less in order to demonstrate subjective improvement in pt's condition. . Appropriate and Ongoing Met 7/5/24  Added goal 7/5/24:     Pt will demonstrate increased MedX average isometric strength value by 114% from initial test resulting in improved ability to perform bending, lifting, and carrying activities safely and confidently.       Plan     Continue with established Plan of Care towards established PT goals.     Therapist: Marcus Mirza, PTA  8/29/2024                             Mary Carmen Montgomery

## 2024-08-30 ENCOUNTER — PATIENT MESSAGE (OUTPATIENT)
Dept: OTHER | Facility: OTHER | Age: 44
End: 2024-08-30
Payer: COMMERCIAL

## 2024-09-19 ENCOUNTER — CLINICAL SUPPORT (OUTPATIENT)
Dept: REHABILITATION | Facility: HOSPITAL | Age: 44
End: 2024-09-19
Payer: COMMERCIAL

## 2024-09-19 DIAGNOSIS — R29.898 DECREASED STRENGTH OF TRUNK AND BACK: ICD-10-CM

## 2024-09-19 DIAGNOSIS — M54.50 CHRONIC BILATERAL LOW BACK PAIN WITHOUT SCIATICA: Primary | ICD-10-CM

## 2024-09-19 DIAGNOSIS — G89.29 CHRONIC BILATERAL LOW BACK PAIN WITHOUT SCIATICA: Primary | ICD-10-CM

## 2024-09-19 PROCEDURE — 97750 PHYSICAL PERFORMANCE TEST: CPT | Mod: 32,PO

## 2024-09-19 NOTE — PROGRESS NOTES
[facility]  Physical Therapy Treatment Note     Name: Jean Claude Dye  Clinic Number: [id number]    Therapy Diagnosis:   Encounter Diagnoses   Name Primary?    Chronic bilateral low back pain without sciatica Yes    Decreased strength of trunk and back          Physician: Linnea Ayala PA-C    Visit Date: 9/19/2024    Physician Orders: PT Eval and Treat- Healthy [facility]  Medical Diagnosis from Referral: lumbar back pain  Evaluation Date: 5/8/2024  Authorization Period Expiration: 12/31/24  Plan of Care Expiration: 7/18/2024  Reassessment 7/5/24  Reassessment Due: 20th visit  Visit # / Visits authorized: 15/20 (+1 prior auth)  MedX testing visit 2    PTA Visit #: 0/5     Time In: 6:55 AM  Time Out: 7:53 PM  Total Billable Time: 58 minutes  INSURANCE and OUTCOMES: Program Benefit Group with Lumbar Outcomes (Oswestry and AQoL) 2/3    Precautions: standard    Pattern of pain determined: Pattern 2    Subjective     Jean Claude Dye reports that 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    Patient reports tolerating previous visit   without adverse effect.  Patient reports their pain to be 4/10 pre-tx, 0/10 post on a 0-10 scale with 0 being no pain and 10 being the worst pain imaginable.  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[place]      Pt goals:  not to feel pain, move like she wants    Objective      Lumbar  Isometric Testing on Med X equipment: Testing administered by PT    Test Initial Baseline Midpoint Final   Date 5/27/24 7/5/24    ROM 0-33 deg 0-36 deg    Max Peak Torque 221  277    Min Peak Torque 92  181    Flex/Ext Ratio 2.4:1 1.5:1    % variance  normative data -9% +87%    % change from initial test N/A visit 1 +104%            Outcomes:  Intake Score: 22% Oswestry, AQoL 11%  Visit  "10 Score:  6% Oswestry  Discharge Score:  Goal Score: 16%           Treatment     Jean Claude received the treatments listed below:      Medical MedX Treatment as follows:    MedX exercise started day 3:  Patient received neuromuscular education for 15  minutes via participation on the Medical MedX Machine. Therapist assisted patient in isolating and engaging spinal stabilization musculature in order to improve functional ability and postural control. Patient performed exercise with therapist guidance in order to accurately use pacer function, avoid valsalva, and optimally exert effort within a safe and effective range via the Arya Exertion Rating Scale. Patient instructed to perform at a midrange of exertion and to complete 15-20 repetitions within appropriate split time, with proper technique, and while maintaining safety.           8/29/2024     5:46 PM   [facility]   Visit Number 15   VAS Pain Rating 1   Recumbent Bike Seat Pos. 14   Time 10   Flexion in Lying 20   Flexion in Sitting 10   Lumbar Weight 92 lbs   Repetitions 20   Rating of Perceived Exertion 2   Ice - Z Lie (in min.) 10             Jean Claude participated in neuromuscular re-education activities to improve balance, coordination, proprioception, motor control and/or posture for 10 minutes. The following activities were included:    Both knees to chest red ball x20, 3-5 sec ea  PPT x20, 5"  Lumbar stabilization march in place x20  Lumbar stabilization leg extension 2/10       Jean Claude participated in therapeutic exercises to develop strength, endurance, ROM, flexibility, posture, and core stabilization for 30 minutes including:  Supine legs on red ball, static rest 2 min  Hamstring stretch 3x 30 sec ea  Seated lumbar flexion 5x 5 sec       Peripheral muscle strengthening which included one set of 15-20 repetitions at a slow and controlled 10-13 second per rep pace focused on strengthening supporting musculature in order to improve body mechanics and " functional mobility. Patient and therapist focused on proper form during treatment to ensure optimal strengthening of each targeted muscle group.  Machines utilized included:Torso rotation, Leg Ext, Leg Curl, Chest Press, Rowing, Hip Abd, Hip Add, and Leg Press,Triceps and Biceps      Jean Claude participated in dynamic functional therapeutic activities to improve functional performance and simulate household and community activities for 00  minutes. The following activities were included:      Jean Claude received manual therapy techniques for 00  minutes. The following activities were included:      Pt given cold pack for 10 minutes to low back and R shoulder in Z-lie.    Patient Education and Home Exercises     Home exercises include:  Rest with legs 90/90 2 min  Bilateral knees to chest with ball if available  PPT  Seated lumbar flexion  Lumbar stabilization march in place  Lumbar stabilization leg extension    Cardio program (V5): -6/10/24  Lifting education (V11): - 7/24/24  Posture/Lumbar roll: instruct visit 1  [employer] Discharge handout (date given): -  Equipment at home/gym membership: no    Education provided:   - PT role and POC  - HEP      Written Home Exercises Provided: Patient instructed to cont prior HEP.  Exercises were reviewed and Jean Claude was able to demonstrate them prior to the end of the session.  Jean Claude demonstrated good  understanding of the education provided.     See EMR under Patient Instructions for exercises provided prior visit.    Assessment     Jean Claude milena today's tx with ther ex and neuromuscular re-ed well with benefit of relief from LBP sx post tx as above. She was not progressed on this date due to length of time since last tx.  She was able to perform MED X and all  peripheral machines at previous level with low PE throughout. She demonstrates good posture with exs.   Reassessment 7/5/24:  Patient has attended 10 visits at [facility] which included MD evaluation, PT evaluation with  isometric testing, and physical therapy treatment including HEP instruction, education, aerobic activity, dynamic strengthening on MedX equipment for the spine, and whole body strengthening on MedX equipment with increasing resistance. Patient demonstrates increased ability to reduce symptoms, improve posture, improve ROM, and improve strength, as stated below:    -Improved posture, she is using lumbar roll  -Improved lumbar ROM, 0-33 degrees initially on MedX test and 0-36 degrees currently .  -Improved strength at each test point on lumbar MedX isometric test with 104% average improvement noted with reduced pain noted by patient.  -Initial outcome tool score of 22% and current outcome tool score 6% indicating reduced pain and improved function.        Patient is making good progress towards established goals.  Pt will continue to benefit from skilled outpatient physical therapy to address the deficits stated in the impairment chart, provide pt/family education and to maximize pt's level of independence in the home and community environment.     Anticipated Barriers for therapy: none  Pt's spiritual, cultural and educational needs considered and pt agreeable to plan of care and goals as stated below:     Goals:   Short term goals:  6 weeks or 10 visits   - Pt will demonstrate increased lumbar MedX ROM by at least 3 degrees from the initial ROM value with improvements noted in functional ROM and ability to perform ADLs. Appropriate and Ongoing Met 7/5/24  - Pt will demonstrate increased MedX average isometric strength value by 20% from initial test resulting in improved ability to perform bending, lifting, and carrying activities safely, confidently. Appropriate and Ongoing Met 7/5/24  - Pt will report a reduction in worst pain score by 1-2 points for improved tolerance for standing/walking, household chores. Appropriate and Ongoing Met 7/5/24  - Pt able to perform HEP correctly with minimal cueing or supervision  from therapist to encourage independent management of symptoms. Appropriate and Ongoing Met 7/5/24     Long term goals: 10 weeks or 20 visits   - Pt will demonstrate increased lumbar MedX ROM by at least 6 degrees from initial ROM value, resulting in improved ability to perform functional forward bending while standing and sitting. Appropriate and Ongoing Progressing  - Pt will demonstrate increased MedX average isometric strength value by 30% from initial test resulting in improved ability to perform bending, lifting, and carrying activities safely and confidently. Appropriate and Ongoing Met 7/5/24  - Pt to demonstrate ability to independently control and reduce their pain through posture positioning and mechanical movements throughout a typical day. Appropriate and Ongoing Progressing  - Pt will demonstrate reduced pain and improved functional outcomes as reported on the Oswestry Disability Index by reaching a score of 16% or less in order to demonstrate subjective improvement in pt's condition. . Appropriate and Ongoing Met 7/5/24  Added goal 7/5/24:     Pt will demonstrate increased MedX average isometric strength value by 114% from initial test resulting in improved ability to perform bending, lifting, and carrying activities safely and confidently.       Plan     Continue with established Plan of Care towards established PT goals.     Therapist: Marcus Mirza, PTA  9/19/2024

## 2024-10-14 ENCOUNTER — CLINICAL SUPPORT (OUTPATIENT)
Dept: REHABILITATION | Facility: HOSPITAL | Age: 44
End: 2024-10-14
Payer: COMMERCIAL

## 2024-10-14 DIAGNOSIS — R29.898 DECREASED STRENGTH OF TRUNK AND BACK: ICD-10-CM

## 2024-10-14 DIAGNOSIS — M54.50 CHRONIC BILATERAL LOW BACK PAIN WITHOUT SCIATICA: Primary | ICD-10-CM

## 2024-10-14 DIAGNOSIS — G89.29 CHRONIC BILATERAL LOW BACK PAIN WITHOUT SCIATICA: Primary | ICD-10-CM

## 2024-10-14 PROCEDURE — 97750 PHYSICAL PERFORMANCE TEST: CPT | Mod: 32,PO | Performed by: PHYSICAL THERAPIST

## 2024-10-14 NOTE — PROGRESS NOTES
[facility]  Physical Therapy Treatment Note     Name: Jean Claude Dye  Clinic Number: [id number]    Therapy Diagnosis:   Encounter Diagnoses   Name Primary?    Chronic bilateral low back pain without sciatica Yes    Decreased strength of trunk and back            Physician: Linnea Ayala PA-C    Visit Date: 10/14/2024    Physician Orders: PT Eval and Treat- Healthy Back  Medical Diagnosis from Referral: lumbar back pain  Evaluation Date: 5/8/2024  Authorization Period Expiration: 12/31/24  Plan of Care Expiration: 11/5/2024  Reassessment 7/5/24  Reassessment Due: 20th visit  Visit # / Visits authorized: 17/20 (+1 prior auth)  MedX testing visit 2    PTA Visit #: 0/5     Time In: 2:00PM  Time Out: 3:05PM  Total Billable Time: 55 minutes  INSURANCE and OUTCOMES: Program Benefit Group with Lumbar Outcomes (Oswestry and AQoL) 2/3    Precautions: standard    Pattern of pain determined: Pattern 2    Subjective     Jean Claude Dye reports that she has increased pain right lumbar and thoracic region, stating she pulled a muscle with lifting her grandmother. She has taken hydrocodone and pain has reduced to 3/10  Patient reports tolerating previous visit   without adverse effect.  Patient reports their pain to be 3/10 pre-tx, 0/10 post on a 0-10 scale with 0 being no pain and 10 being the worst pain imaginable.  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[place]      Pt goals:  not to feel pain, move like she wants    Objective      Lumbar  Isometric Testing on Med X equipment: Testing administered by PT    Test Initial Baseline Midpoint Final   Date 5/27/24 7/5/24    ROM 0-33 deg 0-36 deg    Max Peak Torque 221  277    Min Peak Torque 92  181    Flex/Ext Ratio 2.4:1 1.5:1    % variance  normative data -9% +87%    % change from initial test  "N/A visit 1 +104%            Outcomes:  Intake Score: 22% Oswestry, AQoL 11%  Visit 10 Score:  6% Oswestry  Discharge Score:  Goal Score: 16%           Treatment     Jean Claude received the treatments listed below:      Medical MedX Treatment as follows:    MedX exercise started day 3:  Patient received neuromuscular education for 15  minutes via participation on the Medical MedX Machine. Therapist assisted patient in isolating and engaging spinal stabilization musculature in order to improve functional ability and postural control. Patient performed exercise with therapist guidance in order to accurately use pacer function, avoid valsalva, and optimally exert effort within a safe and effective range via the Arya Exertion Rating Scale. Patient instructed to perform at a midrange of exertion and to complete 15-20 repetitions within appropriate split time, with proper technique, and while maintaining safety.           10/14/2024     2:24 PM   [facility]   Visit Number 17   VAS Pain Rating 3   Recumbent Bike Seat Pos. 14   Time 10   Flexion in Lying 20   Flexion in Sitting 10   Lumbar Weight 95 lbs   Repetitions 15   Rating of Perceived Exertion 3   Ice - Z Lie (in min.) 10          Jean Claude participated in neuromuscular re-education activities to improve balance, coordination, proprioception, motor control and/or posture for 10 minutes. The following activities were included:    Both knees to chest red ball x20, 3-5 sec ea  PPT x20, 5"  Lumbar stabilization march in place x20  Lumbar stabilization leg extension 2/10       Jean Claude participated in therapeutic exercises to develop strength, endurance, ROM, flexibility, posture, and core stabilization for 30 minutes including:  Supine legs on red ball, static rest 2 min  Hamstring stretch 3x 30 sec ea  Seated lumbar flexion 5x 5 sec       Peripheral muscle strengthening which included one set of 15-20 repetitions at a slow and controlled 10-13 second per rep pace focused " on strengthening supporting musculature in order to improve body mechanics and functional mobility. Patient and therapist focused on proper form during treatment to ensure optimal strengthening of each targeted muscle group.  Machines utilized included:Torso rotation, Leg Ext, Leg Curl, Chest Press, Rowing, Hip Abd, Hip Add, and Leg Press,Triceps and Biceps      Jena Claude participated in dynamic functional therapeutic activities to improve functional performance and simulate household and community activities for 00  minutes. The following activities were included:      Jean Claude received manual therapy techniques for 00  minutes. The following activities were included:      Pt given cold pack for 10 minutes to low back and R shoulder in Z-lie.    Patient Education and Home Exercises     Home exercises include:  Rest with legs 90/90 2 min  Bilateral knees to chest with ball if available  PPT  Seated lumbar flexion  Lumbar stabilization march in place  Lumbar stabilization leg extension    Cardio program (V5): -6/10/24  Lifting education (V11): - 7/24/24  Posture/Lumbar roll: instruct visit 1  [employer] Discharge handout (date given): -  Equipment at home/gym membership: no    Education provided:   - PT role and POC  - HEP      Written Home Exercises Provided: Patient instructed to cont prior HEP.  Exercises were reviewed and Jean Claude was able to demonstrate them prior to the end of the session.  Jean Claude demonstrated good  understanding of the education provided.     See EMR under Patient Instructions for exercises provided prior visit.    Assessment     Jean Claude was able to tolerate increased resistance with MEDX and several peripheral machines with appropriate perceived exertion and without increase of pain, despite length of time since last visit. She has been occupied with caring for her grandmother when not at work. Discussed trying to continue with her HEP when unable to come to PT or do all exercises.  Reassessment  7/5/24:  Patient has attended 10 visits at [facility] which included MD evaluation, PT evaluation with isometric testing, and physical therapy treatment including HEP instruction, education, aerobic activity, dynamic strengthening on MedX equipment for the spine, and whole body strengthening on MedX equipment with increasing resistance. Patient demonstrates increased ability to reduce symptoms, improve posture, improve ROM, and improve strength, as stated below:    -Improved posture, she is using lumbar roll  -Improved lumbar ROM, 0-33 degrees initially on MedX test and 0-36 degrees currently .  -Improved strength at each test point on lumbar MedX isometric test with 104% average improvement noted with reduced pain noted by patient.  -Initial outcome tool score of 22% and current outcome tool score 6% indicating reduced pain and improved function.        Patient is making good progress towards established goals.  Pt will continue to benefit from skilled outpatient physical therapy to address the deficits stated in the impairment chart, provide pt/family education and to maximize pt's level of independence in the home and community environment.     Anticipated Barriers for therapy: none  Pt's spiritual, cultural and educational needs considered and pt agreeable to plan of care and goals as stated below:     Goals:   Short term goals:  6 weeks or 10 visits   - Pt will demonstrate increased lumbar MedX ROM by at least 3 degrees from the initial ROM value with improvements noted in functional ROM and ability to perform ADLs. Appropriate and Ongoing Met 7/5/24  - Pt will demonstrate increased MedX average isometric strength value by 20% from initial test resulting in improved ability to perform bending, lifting, and carrying activities safely, confidently. Appropriate and Ongoing Met 7/5/24  - Pt will report a reduction in worst pain score by 1-2 points for improved tolerance for standing/walking, household  chores. Appropriate and Ongoing Met 7/5/24  - Pt able to perform HEP correctly with minimal cueing or supervision from therapist to encourage independent management of symptoms. Appropriate and Ongoing Met 7/5/24     Long term goals: 10 weeks or 20 visits   - Pt will demonstrate increased lumbar MedX ROM by at least 6 degrees from initial ROM value, resulting in improved ability to perform functional forward bending while standing and sitting. Appropriate and Ongoing Progressing  - Pt will demonstrate increased MedX average isometric strength value by 30% from initial test resulting in improved ability to perform bending, lifting, and carrying activities safely and confidently. Appropriate and Ongoing Met 7/5/24  - Pt to demonstrate ability to independently control and reduce their pain through posture positioning and mechanical movements throughout a typical day. Appropriate and Ongoing Progressing  - Pt will demonstrate reduced pain and improved functional outcomes as reported on the Oswestry Disability Index by reaching a score of 16% or less in order to demonstrate subjective improvement in pt's condition. . Appropriate and Ongoing Met 7/5/24  Added goal 7/5/24:     Pt will demonstrate increased MedX average isometric strength value by 114% from initial test resulting in improved ability to perform bending, lifting, and carrying activities safely and confidently.       Plan     Continue with established Plan of Care towards established PT goals.     Therapist: Luda Kaur, PT  10/14/2024

## 2024-10-24 NOTE — PROGRESS NOTES
Patient ID: Jean Claude Dye is a 44 y.o. Black or  female    Subjective  Chief Complaint: patient presents for medical weight loss management.    Contraindications to GLP-1 receptor agonist therapy:   Denies personal or family history of MTC and personal history of MEN2     Co-morbidities: HTN, JORGE    History of weight loss therapy:  Pt previously tried oral medications but denies use of GLP-1 medications.    Weight loss history:  Starting weight:    10/22/2024   Recent Readings    Weight (lbs) 329 lb    BMI 48.58 BMI      Objective  Lab Results   Component Value Date     05/27/2024     02/27/2024     04/10/2023     Lab Results   Component Value Date    K 4.2 05/27/2024    K 4.3 02/27/2024    K 3.9 04/10/2023     Lab Results   Component Value Date     05/27/2024     02/27/2024     04/10/2023     Lab Results   Component Value Date    CO2 27 05/27/2024    CO2 30 02/27/2024    CO2 27 04/10/2023     Lab Results   Component Value Date    BUN 15 05/27/2024    BUN 15 02/27/2024    BUN 14 04/10/2023     Lab Results   Component Value Date    GLU 99 05/27/2024    GLU 92 02/27/2024    GLU 95 04/10/2023     Lab Results   Component Value Date    CALCIUM 9.5 05/27/2024    CALCIUM 9.1 02/27/2024    CALCIUM 9.3 04/10/2023     Lab Results   Component Value Date    PROT 8.0 05/27/2024    PROT 7.3 02/27/2024    PROT 7.8 04/10/2023     Lab Results   Component Value Date    ALBUMIN 3.7 05/27/2024    ALBUMIN 4.0 02/27/2024    ALBUMIN 3.6 04/10/2023     Lab Results   Component Value Date    BILITOT 0.4 05/27/2024    BILITOT 0.5 02/27/2024    BILITOT 0.3 04/10/2023     Lab Results   Component Value Date    AST 23 05/27/2024    AST 35 02/27/2024    AST 22 04/10/2023     Lab Results   Component Value Date    ALT 25 05/27/2024    ALT 28 02/27/2024    ALT 26 04/10/2023     Lab Results   Component Value Date    ANIONGAP 10 05/27/2024    ANIONGAP 5 02/27/2024    ANIONGAP 9 04/10/2023     Lab  Results   Component Value Date    CREATININE 1.1 05/27/2024    CREATININE 1.17 02/27/2024    CREATININE 1.2 04/10/2023     Lab Results   Component Value Date    EGFRNORACEVR >60.0 05/27/2024    EGFRNORACEVR 59 (A) 02/27/2024    EGFRNORACEVR 57.2 (A) 04/10/2023     Assessment/Plan  -Pt qualifies for GLP-1 RA therapy based on BMI greater than or equal to 30 kg/m2  -Initiate Wegovy 0.25 mg once weekly for 1 month  -Then increase to Wegovy 0.5 mg once weekly for 1 month  -Then increase to Wegovy 1 mg once weekly  -RTC in 3 months    Patient consented to pharmacist management via collaborative practice.

## 2024-10-25 ENCOUNTER — OFFICE VISIT (OUTPATIENT)
Dept: INTERNAL MEDICINE | Facility: CLINIC | Age: 44
End: 2024-10-25
Payer: COMMERCIAL

## 2024-10-25 ENCOUNTER — PATIENT MESSAGE (OUTPATIENT)
Dept: INTERNAL MEDICINE | Facility: CLINIC | Age: 44
End: 2024-10-25

## 2024-10-25 DIAGNOSIS — E66.01 OBESITY, CLASS III, BMI 40-49.9 (MORBID OBESITY): Primary | ICD-10-CM

## 2024-10-25 RX ORDER — SEMAGLUTIDE 0.5 MG/.5ML
0.5 INJECTION, SOLUTION SUBCUTANEOUS
Qty: 2 ML | Refills: 0 | Status: SHIPPED | OUTPATIENT
Start: 2024-10-25

## 2024-10-25 RX ORDER — SEMAGLUTIDE 1 MG/.5ML
1 INJECTION, SOLUTION SUBCUTANEOUS
Qty: 2 ML | Refills: 0 | Status: SHIPPED | OUTPATIENT
Start: 2024-10-25

## 2024-10-25 RX ORDER — SEMAGLUTIDE 0.25 MG/.5ML
0.25 INJECTION, SOLUTION SUBCUTANEOUS
Qty: 2 ML | Refills: 0 | Status: SHIPPED | OUTPATIENT
Start: 2024-10-25

## 2024-11-22 ENCOUNTER — PATIENT MESSAGE (OUTPATIENT)
Dept: ADMINISTRATIVE | Facility: OTHER | Age: 44
End: 2024-11-22
Payer: COMMERCIAL

## 2024-12-18 ENCOUNTER — CLINICAL SUPPORT (OUTPATIENT)
Dept: REHABILITATION | Facility: HOSPITAL | Age: 44
End: 2024-12-18
Payer: COMMERCIAL

## 2024-12-18 DIAGNOSIS — M54.50 CHRONIC BILATERAL LOW BACK PAIN WITHOUT SCIATICA: Primary | ICD-10-CM

## 2024-12-18 DIAGNOSIS — R29.898 DECREASED STRENGTH OF TRUNK AND BACK: ICD-10-CM

## 2024-12-18 DIAGNOSIS — G89.29 CHRONIC BILATERAL LOW BACK PAIN WITHOUT SCIATICA: Primary | ICD-10-CM

## 2024-12-18 PROCEDURE — 97750 PHYSICAL PERFORMANCE TEST: CPT | Mod: 32,PO | Performed by: PHYSICAL THERAPIST

## 2024-12-18 NOTE — PROGRESS NOTES
[facility] - [facility]ALTHY [facility]  Physical Therapy Treatment Note     Name: Jean Claude Dye  Clinic Number: [id number]    Therapy Diagnosis:   Encounter Diagnoses   Name Primary?    Chronic bilateral low back pain without sciatica Yes    Decreased strength of trunk and back        Physician: Linnea Ayala PA-C    Visit Date: 12/18/2024    Physician Orders: PT Eval and Treat- Healthy Back  Medical Diagnosis from Referral: lumbar back pain  Evaluation Date: 5/8/2024  Authorization Period Expiration: 12/31/24  Plan of Care Expiration: 11/5/2024  Reassessment 7/5/24  Reassessment Due: 20th visit  Visit # / Visits authorized: 18/20 (+1 prior auth)  MedX testing visit 2    PTA Visit #: 0/5     Time In: 7:05AM  Time Out: 8:10AM  Total Billable Time: 55 minutes  INSURANCE and OUTCOMES: Program Benefit Group with Lumbar Outcomes (Oswestry and AQoL) 2/3    Precautions: standard    Pattern of pain determined: Pattern 2    Subjective     Jean Claude Dye reports that she has been feeling good and without pain. She resumed PT today after a period of being unable to come and taking care of grandmother. She is now in a home and pt is not doing as much physical care for her. She is also losing weight and she has more energy.  Patient reports tolerating previous visit   without adverse effect.  Patient reports their pain to be 0/10  on a 0-10 scale with 0 being no pain and 10 being the worst pain imaginable.  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[place]      Pt goals:  not to feel pain, move like she wants    Objective      Lumbar  Isometric Testing on Med X equipment: Testing administered by PT    Test Initial Baseline Midpoint Final   Date 5/27/24 7/5/24    ROM 0-33 deg 0-36 deg    Max Peak Torque 221  277    Min Peak Torque 92  181    Flex/Ext Ratio 2.4:1 1.5:1   "  % variance  normative data -9% +87%    % change from initial test N/A visit 1 +104%            Outcomes:  Intake Score: 22% Oswestry, AQoL 11%  Visit 10 Score:  6% Oswestry  Discharge Score:  Goal Score: 16%           Treatment     Jean Claude received the treatments listed below:      Medical MedX Treatment as follows:    MedX exercise started day 3:  Patient received neuromuscular education for 15  minutes via participation on the Medical MedX Machine. Therapist assisted patient in isolating and engaging spinal stabilization musculature in order to improve functional ability and postural control. Patient performed exercise with therapist guidance in order to accurately use pacer function, avoid valsalva, and optimally exert effort within a safe and effective range via the Arya Exertion Rating Scale. Patient instructed to perform at a midrange of exertion and to complete 15-20 repetitions within appropriate split time, with proper technique, and while maintaining safety.           12/18/2024     7:35 AM   [facility]   Visit Number 18   VAS Pain Rating 0   Treadmill Time (in min.) 10 min   Flexion in Lying 20   Flexion in Sitting 10   Lumbar Weight 95 lbs   Repetitions 20   Rating of Perceived Exertion 3   Ice - Z Lie (in min.) 10              [place] participated in neuromuscular re-education activities to improve balance, coordination, proprioception, motor control and/or posture for 10 minutes. The following activities were included:    Both knees to chest red ball x20, 3-5 sec ea  PPT x20, 5"  Lumbar stabilization march in place x20  Lumbar stabilization leg extension 2/10       [place] participated in therapeutic exercises to develop strength, endurance, ROM, flexibility, posture, and core stabilization for 30 minutes including:  Supine legs on red ball, static rest 2 min  Hamstring stretch 3x 30 sec ea  Seated lumbar flexion 5x 5 sec     Peripheral muscle strengthening which included one set of 15-20 " repetitions at a slow and controlled 10-13 second per rep pace focused on strengthening supporting musculature in order to improve body mechanics and functional mobility. Patient and therapist focused on proper form during treatment to ensure optimal strengthening of each targeted muscle group.  Machines utilized included:Torso rotation, Leg Ext, Leg Curl, Chest Press, Rowing, Hip Abd, Hip Add, and Leg Press,Triceps and Biceps      Jean Claude participated in dynamic functional therapeutic activities to improve functional performance and simulate household and community activities for 00  minutes. The following activities were included:      Jean Claude received manual therapy techniques for 00  minutes. The following activities were included:      Pt given cold pack for 10 minutes to low back and R shoulder in Z-lie.    Patient Education and Home Exercises     Home exercises include:  Rest with legs 90/90 2 min  Bilateral knees to chest with ball if available  PPT  Seated lumbar flexion  Lumbar stabilization march in place  Lumbar stabilization leg extension    Cardio program (V5): -6/10/24  Lifting education (V11): - 7/24/24  Posture/Lumbar roll: instruct visit 1  Fridge Magnet Discharge handout (date given): -  Equipment at home/gym membership: no    Education provided:   - PT role and POC  - HEP      Written Home Exercises Provided: Patient instructed to cont prior HEP.  Exercises were reviewed and Jean Claude was able to demonstrate them prior to the end of the session.  Jean Claude demonstrated good  understanding of the education provided.     See EMR under Patient Instructions for exercises provided prior visit.    Assessment     Pt has been doing her home exercises and walking daily for exercise. She has been without pain. Resumed Healthy Back today at previous level without difficulty and with appropriate perceived exertion. Discussed wellness at completion of physical therapy portion of program. Pt plans to come here for  wellness.    Reassessment 7/5/24:  Patient has attended 10 visits at [facility] which included MD evaluation, PT evaluation with isometric testing, and physical therapy treatment including HEP instruction, education, aerobic activity, dynamic strengthening on MedX equipment for the spine, and whole body strengthening on MedX equipment with increasing resistance. Patient demonstrates increased ability to reduce symptoms, improve posture, improve ROM, and improve strength, as stated below:    -Improved posture, she is using lumbar roll  -Improved lumbar ROM, 0-33 degrees initially on MedX test and 0-36 degrees currently .  -Improved strength at each test point on lumbar MedX isometric test with 104% average improvement noted with reduced pain noted by patient.  -Initial outcome tool score of 22% and current outcome tool score 6% indicating reduced pain and improved function.        Patient is making good progress towards established goals.  Pt will continue to benefit from skilled outpatient physical therapy to address the deficits stated in the impairment chart, provide pt/family education and to maximize pt's level of independence in the home and community environment.     Anticipated Barriers for therapy: none  Pt's spiritual, cultural and educational needs considered and pt agreeable to plan of care and goals as stated below:     Goals:   Short term goals:  6 weeks or 10 visits   - Pt will demonstrate increased lumbar MedX ROM by at least 3 degrees from the initial ROM value with improvements noted in functional ROM and ability to perform ADLs. Appropriate and Ongoing Met 7/5/24  - Pt will demonstrate increased MedX average isometric strength value by 20% from initial test resulting in improved ability to perform bending, lifting, and carrying activities safely, confidently. Appropriate and Ongoing Met 7/5/24  - Pt will report a reduction in worst pain score by 1-2 points for improved tolerance for  standing/walking, household chores. Appropriate and Ongoing Met 7/5/24  - Pt able to perform HEP correctly with minimal cueing or supervision from therapist to encourage independent management of symptoms. Appropriate and Ongoing Met 7/5/24     Long term goals: 10 weeks or 20 visits   - Pt will demonstrate increased lumbar MedX ROM by at least 6 degrees from initial ROM value, resulting in improved ability to perform functional forward bending while standing and sitting. Appropriate and Ongoing Progressing  - Pt will demonstrate increased MedX average isometric strength value by 30% from initial test resulting in improved ability to perform bending, lifting, and carrying activities safely and confidently. Appropriate and Ongoing Met 7/5/24  - Pt to demonstrate ability to independently control and reduce their pain through posture positioning and mechanical movements throughout a typical day. Appropriate and Ongoing Progressing  - Pt will demonstrate reduced pain and improved functional outcomes as reported on the Oswestry Disability Index by reaching a score of 16% or less in order to demonstrate subjective improvement in pt's condition. . Appropriate and Ongoing Met 7/5/24  Added goal 7/5/24:     Pt will demonstrate increased MedX average isometric strength value by 114% from initial test resulting in improved ability to perform bending, lifting, and carrying activities safely and confidently.       Plan     Continue with established Plan of Care towards established PT goals.     Therapist: Luda Kaur, PT  12/18/2024

## 2024-12-18 NOTE — PATIENT INSTRUCTIONS
"HEALTHY BACK TOOLS        KEEP YOUR SPINE FEELING FINE   HEALTHY HABITS   Do your "GO TO" stretches 2/day   Get a good night's REST   Watch your POSTURE in sitting/standing Drink PLENTY of water   Use a lumbar roll Eat LOTS of fruits & vegetables   GET UP often (walk and/or stretch) Manage your STRESS   Make your workplace IDEAL FOR YOU  Don't smoke   Lift correctly EXERCISE   Practice positive self talk-talk to yourself kindly like you would your best friend  Remember the value of mindfulness and breathing relaxation tools for self care                                                                                                                                 QR code for breathing    WHAT TO DO WHEN SYMPTOMS FLARE UP  Back and neck pain may occasionally flare up.  If you experience a flare   up, remember your tools. Be encouraged, by remembering that flare-ups will   usually pass.   My Tools:  ~Use your "Go To" Stretches/Positions   ~Keep Moving-pain usually gets better if you move  ~Z lie (with or without ice)  10 min several times a day until symptoms reduce  ~Slowly resume normal activities   ~Practice Deep Breathing and Relaxation techniques                                                 MY EXERCISE PLAN  GO TO STRETCHES  2/day (like brushing your teeth) STRENGTHENING  2-3 times/week CARDIO PROGRAM  30-45 min/3-5x/week   Rest with legs up 2 min Gym exercise vs exercise from health  walking   Pull knees up 3-5 sec x20 Pelvic tilt x20 bike   Hamstring stretch 3x 30 sec Hold pelvic tilt march in place x20    Seated bend over stretch 5x 5 sec Hold pelvic tilt- leg extension 2 sets of 10 each             "

## 2024-12-23 ENCOUNTER — CLINICAL SUPPORT (OUTPATIENT)
Dept: REHABILITATION | Facility: HOSPITAL | Age: 44
End: 2024-12-23
Payer: COMMERCIAL

## 2024-12-23 DIAGNOSIS — M54.50 CHRONIC BILATERAL LOW BACK PAIN WITHOUT SCIATICA: Primary | ICD-10-CM

## 2024-12-23 DIAGNOSIS — R29.898 DECREASED STRENGTH OF TRUNK AND BACK: ICD-10-CM

## 2024-12-23 DIAGNOSIS — G89.29 CHRONIC BILATERAL LOW BACK PAIN WITHOUT SCIATICA: Primary | ICD-10-CM

## 2024-12-23 PROCEDURE — 97750 PHYSICAL PERFORMANCE TEST: CPT | Mod: 32,PO | Performed by: PHYSICAL THERAPIST

## 2024-12-23 NOTE — PROGRESS NOTES
[facility]  Physical Therapy Treatment Note     Name: Jean Claude Dye  Clinic Number: [id number]    Therapy Diagnosis:   Encounter Diagnoses   Name Primary?    Chronic bilateral low back pain without sciatica Yes    Decreased strength of trunk and back        Physician: Linnea Ayala PA-C    Visit Date: 12/23/2024    Physician Orders: PT Eval and Treat- Healthy Back  Medical Diagnosis from Referral: lumbar back pain  Evaluation Date: 5/8/2024  Authorization Period Expiration: 12/31/24  Plan of Care Expiration: 11/5/2024  Reassessment 7/5/24  Reassessment Due: 20th visit  Visit # / Visits authorized: 18/20 (+1 prior auth)  MedX testing visit 2    PTA Visit #: 0/5     Time In: 6:52AM  Time Out: 7:52AM  Total Billable Time: 50 minutes  INSURANCE and OUTCOMES: Program Benefit Group with Lumbar Outcomes (Oswestry and AQoL) 2/3    Precautions: standard    Pattern of pain determined: Pattern 2    Subjective     Jean Claude Dye reports she is without pain even after moving her son this past weekend.  Patient reports tolerating previous visit   without adverse effect.  Patient reports their pain to be 0/10  on a 0-10 scale with 0 being no pain and 10 being the worst pain imaginable.  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[place]      Pt goals:  not to feel pain, move like she wants    Objective      Lumbar  Isometric Testing on Med X equipment: Testing administered by PT    Test Initial Baseline Midpoint Final   Date 5/27/24 7/5/24    ROM 0-33 deg 0-36 deg    Max Peak Torque 221  277    Min Peak Torque 92  181    Flex/Ext Ratio 2.4:1 1.5:1    % variance  normative data -9% +87%    % change from initial test N/A visit 1 +104%            Outcomes:  Intake Score: 22% Oswestry, AQoL 11%  Visit 10 Score:  6% Oswestry  Discharge Score:  Goal  "Score: 16%           Treatment     Jean Claude received the treatments listed below:      Medical MedX Treatment as follows:    MedX exercise started day 3:  Patient received neuromuscular education for 15  minutes via participation on the Medical MedX Machine. Therapist assisted patient in isolating and engaging spinal stabilization musculature in order to improve functional ability and postural control. Patient performed exercise with therapist guidance in order to accurately use pacer function, avoid valsalva, and optimally exert effort within a safe and effective range via the Arya Exertion Rating Scale. Patient instructed to perform at a midrange of exertion and to complete 15-20 repetitions within appropriate split time, with proper technique, and while maintaining safety.           12/23/2024     7:19 AM   [facility]   Visit Number 19   VAS Pain Rating 0   Treadmill Time (in min.) 10 min   Flexion in Lying 20   Flexion in Sitting 10   Lumbar Weight 98 lbs   Repetitions 15   Rating of Perceived Exertion 3   Ice - Z Lie (in min.) 10              [place] participated in neuromuscular re-education activities to improve balance, coordination, proprioception, motor control and/or posture for 10 minutes. The following activities were included:    Both knees to chest red ball x20, 3-5 sec ea  PPT x20, 5"  Lumbar stabilization march in place x20  Lumbar stabilization leg extension 2/10       [place] participated in therapeutic exercises to develop strength, endurance, ROM, flexibility, posture, and core stabilization for 25 minutes including:  Supine legs on red ball, static rest 2 min  Hamstring stretch 3x 30 sec ea  Seated lumbar flexion 5x 5 sec     Peripheral muscle strengthening which included one set of 15-20 repetitions at a slow and controlled 10-13 second per rep pace focused on strengthening supporting musculature in order to improve body mechanics and functional mobility. Patient and therapist focused on " "proper form during treatment to ensure optimal strengthening of each targeted muscle group.  Machines utilized included:Torso rotation, Leg Ext, Leg Curl, Chest Press, Rowing, Hip Abd, Hip Add, and Leg Press,Triceps and Biceps      Jean Claude participated in dynamic functional therapeutic activities to improve functional performance and simulate household and community activities for 00  minutes. The following activities were included:      Jean Claude received manual therapy techniques for 00  minutes. The following activities were included:      Pt given cold pack for 10 minutes to low back and R shoulder in Z-lie.    Patient Education and Home Exercises     Home exercises include:  Rest with legs 90/90 2 min  Bilateral knees to chest with ball if available  PPT  Seated lumbar flexion  Lumbar stabilization march in place  Lumbar stabilization leg extension    Cardio program (V5): -6/10/24  Lifting education (V11): - 7/24/24  Posture/Lumbar roll: instruct visit 1  Fridge Magnet Discharge handout (date given): - 12/23/24  Equipment at home/gym membership: no    Education provided:   - PT role and POC  - HEP      Written Home Exercises Provided: Patient instructed to cont prior HEP.  Exercises were reviewed and Jean Claude was able to demonstrate them prior to the end of the session.  Jean Claude demonstrated good  understanding of the education provided.     See EMR under Patient Instructions for exercises provided prior visit.    Assessment     Pt has been consistent with HEP. Reviewed "Tools to Keep Your Back Feeling Fine". Pt given " fridge magnet" handout for reference. Will retest isometric back strength next visit and transition to wellness.    Reassessment 7/5/24:  Patient has attended 10 visits at [facility] which included MD evaluation, PT evaluation with isometric testing, and physical therapy treatment including HEP instruction, education, aerobic activity, dynamic strengthening on MedX equipment for the spine, and " whole body strengthening on MedX equipment with increasing resistance. Patient demonstrates increased ability to reduce symptoms, improve posture, improve ROM, and improve strength, as stated below:    -Improved posture, she is using lumbar roll  -Improved lumbar ROM, 0-33 degrees initially on MedX test and 0-36 degrees currently .  -Improved strength at each test point on lumbar MedX isometric test with 104% average improvement noted with reduced pain noted by patient.  -Initial outcome tool score of 22% and current outcome tool score 6% indicating reduced pain and improved function.        Patient is making good progress towards established goals.  Pt will continue to benefit from skilled outpatient physical therapy to address the deficits stated in the impairment chart, provide pt/family education and to maximize pt's level of independence in the home and community environment.     Anticipated Barriers for therapy: none  Pt's spiritual, cultural and educational needs considered and pt agreeable to plan of care and goals as stated below:     Goals:   Short term goals:  6 weeks or 10 visits   - Pt will demonstrate increased lumbar MedX ROM by at least 3 degrees from the initial ROM value with improvements noted in functional ROM and ability to perform ADLs. Appropriate and Ongoing Met 7/5/24  - Pt will demonstrate increased MedX average isometric strength value by 20% from initial test resulting in improved ability to perform bending, lifting, and carrying activities safely, confidently. Appropriate and Ongoing Met 7/5/24  - Pt will report a reduction in worst pain score by 1-2 points for improved tolerance for standing/walking, household chores. Appropriate and Ongoing Met 7/5/24  - Pt able to perform HEP correctly with minimal cueing or supervision from therapist to encourage independent management of symptoms. Appropriate and Ongoing Met 7/5/24     Long term goals: 10 weeks or 20 visits   - Pt will demonstrate  increased lumbar MedX ROM by at least 6 degrees from initial ROM value, resulting in improved ability to perform functional forward bending while standing and sitting. Appropriate and Ongoing Progressing  - Pt will demonstrate increased MedX average isometric strength value by 30% from initial test resulting in improved ability to perform bending, lifting, and carrying activities safely and confidently. Appropriate and Ongoing Met 7/5/24  - Pt to demonstrate ability to independently control and reduce their pain through posture positioning and mechanical movements throughout a typical day. Appropriate and Ongoing Progressing  - Pt will demonstrate reduced pain and improved functional outcomes as reported on the Oswestry Disability Index by reaching a score of 16% or less in order to demonstrate subjective improvement in pt's condition. . Appropriate and Ongoing Met 7/5/24  Added goal 7/5/24:     Pt will demonstrate increased MedX average isometric strength value by 114% from initial test resulting in improved ability to perform bending, lifting, and carrying activities safely and confidently.       Plan     Continue with established Plan of Care towards established PT goals.     Therapist: Luda Kaur, PT  12/23/2024

## 2025-01-03 ENCOUNTER — CLINICAL SUPPORT (OUTPATIENT)
Dept: REHABILITATION | Facility: HOSPITAL | Age: 45
End: 2025-01-03
Payer: COMMERCIAL

## 2025-01-03 DIAGNOSIS — G89.29 CHRONIC BILATERAL LOW BACK PAIN WITHOUT SCIATICA: Primary | ICD-10-CM

## 2025-01-03 DIAGNOSIS — M54.50 CHRONIC BILATERAL LOW BACK PAIN WITHOUT SCIATICA: Primary | ICD-10-CM

## 2025-01-03 DIAGNOSIS — R29.898 DECREASED STRENGTH OF TRUNK AND BACK: ICD-10-CM

## 2025-01-03 PROCEDURE — 97750 PHYSICAL PERFORMANCE TEST: CPT | Mod: 32,PO | Performed by: PHYSICAL THERAPIST

## 2025-01-03 NOTE — PROGRESS NOTES
[facility] OUTPATIENT THERAPY AND WELLNESS - HEALTHY BACK  Physical Therapy Treatment Note     Name: Jean Claude Dye  Clinic Number: [id number]    Therapy Diagnosis:   Encounter Diagnoses   Name Primary?    Chronic bilateral low back pain without sciatica Yes    Decreased strength of trunk and back          Physician: Linnea Ayala PA-C    Visit Date: 1/3/2025    Physician Orders: PT Eval and Treat- Healthy Back  Medical Diagnosis from Referral: lumbar back pain  Evaluation Date: 5/8/2024  Authorization Period Expiration: 12/31/25  Plan of Care Expiration: 1/3/25  Reassessment 7/5/24  Reassessment Due: 20th visit  Visit # / Visits authorized: 1/20 (+19 prior auth)  MedX testing visit 2    PTA Visit #: 0/5     Time In: 6:53AM  Time Out: 8:00AM  Total Billable Time: 57 minutes  INSURANCE and OUTCOMES: Program Benefit Group with Lumbar Outcomes (Oswestry and AQoL) 3/3    Precautions: standard    Pattern of pain determined: Pattern 2    Subjective     Jean Claude Dye reports she has been feeling good and is without pain currently  Patient reports tolerating previous visit   without adverse effect.  Patient reports their pain to be 0/10  on a 0-10 scale with 0 being no pain and 10 being the worst pain imaginable.  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[place]      Pt goals:  not to feel pain, move like she wants    Objective      Lumbar  Isometric Testing on Med X equipment: Testing administered by PT    Test Initial Baseline Midpoint Final   Date 5/27/24 7/5/24 1/3/25   ROM 0-33 deg 0-36 deg 0-39 deg   Max Peak Torque 221  277 336   Min Peak Torque 92  181 196   Flex/Ext Ratio 2.4:1 1.5:1 1.7:1   % variance  normative data -9% +87% +111%   % change from initial test N/A visit 1 +104% +124%           Outcomes:  Intake Score: 22% Oswestry, AQoL 11%  Visit 10 Score:  6%  Oswestry  Discharge Score: 2% Oswestry (raw score 1), AQoL 3  Goal Score: 16%           Treatment     Jean Claude received the treatments listed below:          Jean Claude was seen for performance testing for spinal musculature  to engage spinal musculature correctly for isometric testing within patient's range of motion, with comparison to baseline testing for  15 minutes.  This includes the MedX practice exercise component and practice and standard testing.  Med x dynamic exercise and testing performed with instructions to guide the patient safely through the isometric testing.  Patient informed to perform isometric test correctly, and safely, building best force they safely can and not pushing through pain.  Patient demonstrated understanding of information       Medical MedX Treatment as follows:    MedX exercise started day 3:  Patient received neuromuscular education for 00  minutes via participation on the Medical MedX Machine. Therapist assisted patient in isolating and engaging spinal stabilization musculature in order to improve functional ability and postural control. Patient performed exercise with therapist guidance in order to accurately use pacer function, avoid valsalva, and optimally exert effort within a safe and effective range via the Arya Exertion Rating Scale. Patient instructed to perform at a midrange of exertion and to complete 15-20 repetitions within appropriate split time, with proper technique, and while maintaining safety.           1/3/2025     7:06 AM   [facility]   Visit Number 20   VAS Pain Rating 0   Treadmill Time (in min.) 10 min   Flexion in Lying 20   Flexion in Sitting 10   Lumbar Flexion 39   Lumbar Extension 0   Lumbar Peak Torque 336 ft. lbs.   Min Torque 196   Test Percent Gain in Strength from Initial  124 %   Ice - Z Lie (in min.) 10                  Jean Claude participated in neuromuscular re-education activities to improve balance, coordination, proprioception, motor control  "and/or posture for 10 minutes. The following activities were included:    Both knees to chest red ball x20, 3-5 sec ea  PPT x20, 5"  Lumbar stabilization march in place x20  Lumbar stabilization leg extension 2/10       Nikea participated in therapeutic exercises to develop strength, endurance, ROM, flexibility, posture, and core stabilization for 40 minutes including:  Supine legs on red ball, static rest 2 min  Hamstring stretch 3x 30 sec ea  Seated lumbar flexion 5x 5 sec     Peripheral muscle strengthening which included one set of 15-20 repetitions at a slow and controlled 10-13 second per rep pace focused on strengthening supporting musculature in order to improve body mechanics and functional mobility. Patient and therapist focused on proper form during treatment to ensure optimal strengthening of each targeted muscle group.  Machines utilized included:Torso rotation, Leg Ext, Leg Curl, Chest Press, Rowing, Hip Abd, Hip Add, and Leg Press,Triceps and Biceps      Nikea participated in dynamic functional therapeutic activities to improve functional performance and simulate household and community activities for 00  minutes. The following activities were included:      Nikea received manual therapy techniques for 00  minutes. The following activities were included:      Pt given cold pack for 10 minutes to low back and R shoulder in Z-lie.    Patient Education and Home Exercises     Home exercises include:  Rest with legs 90/90 2 min  Bilateral knees to chest with ball if available  PPT  Seated lumbar flexion  Lumbar stabilization march in place  Lumbar stabilization leg extension    Cardio program (V5): -6/10/24  Lifting education (V11): - 7/24/24  Posture/Lumbar roll: instruct visit 1  Fridge [employer] Discharge handout (date given): - 12/23/24  Equipment at home/gym membership: no    Education provided:   - PT role and POC  - HEP      Written Home Exercises Provided: Patient instructed to cont prior " HEP.  Exercises were reviewed and Jean Claude was able to demonstrate them prior to the end of the session.  [place] demonstrated good  understanding of the education provided.     See EMR under Patient Instructions for exercises provided prior visit.    Assessment       Reassessment 1/3/25:  Patient retested at visit 20 and shows improvement in: pain, ROM, strength and function  Improved posture, using lumbar roll  Improved lumbar ROM, 0-33 degrees initially on med ex test and 0-39 degrees  currently  Improved strength at each test point on lumbar med ex IM test with 124%  average improvement noted with Reduced pain  Noted by patient  Initial outcome tool score of 22% and current outcome tool score of 2% indicating reduced pain and improved function      Reassessment 7/5/24:  Patient has attended 10 visits at [facility] which included MD evaluation, PT evaluation with isometric testing, and physical therapy treatment including HEP instruction, education, aerobic activity, dynamic strengthening on MedX equipment for the spine, and whole body strengthening on MedX equipment with increasing resistance. Patient demonstrates increased ability to reduce symptoms, improve posture, improve ROM, and improve strength, as stated below:    -Improved posture, she is using lumbar roll  -Improved lumbar ROM, 0-33 degrees initially on MedX test and 0-36 degrees currently .  -Improved strength at each test point on lumbar MedX isometric test with 104% average improvement noted with reduced pain noted by patient.  -Initial outcome tool score of 22% and current outcome tool score 6% indicating reduced pain and improved function.        Patient is making good progress towards established goals.    Anticipated Barriers for therapy: none  Pt's spiritual, cultural and educational needs considered and pt agreeable to plan of care and goals as stated below:     Goals:   Short term goals:  6 weeks or 10 visits   - Pt will demonstrate  increased lumbar MedX ROM by at least 3 degrees from the initial ROM value with improvements noted in functional ROM and ability to perform ADLs. Appropriate and Ongoing Met 7/5/24  - Pt will demonstrate increased MedX average isometric strength value by 20% from initial test resulting in improved ability to perform bending, lifting, and carrying activities safely, confidently. Appropriate and Ongoing Met 7/5/24  - Pt will report a reduction in worst pain score by 1-2 points for improved tolerance for standing/walking, household chores. Appropriate and Ongoing Met 7/5/24  - Pt able to perform HEP correctly with minimal cueing or supervision from therapist to encourage independent management of symptoms. Appropriate and Ongoing Met 7/5/24     Long term goals: 10 weeks or 20 visits   - Pt will demonstrate increased lumbar MedX ROM by at least 6 degrees from initial ROM value, resulting in improved ability to perform functional forward bending while standing and sitting. Appropriate and Ongoing Met 1/3/25  - Pt will demonstrate increased MedX average isometric strength value by 30% from initial test resulting in improved ability to perform bending, lifting, and carrying activities safely and confidently. Appropriate and Ongoing Met 7/5/24  - Pt to demonstrate ability to independently control and reduce their pain through posture positioning and mechanical movements throughout a typical day. Appropriate and Ongoing Met 1/3/25  - Pt will demonstrate reduced pain and improved functional outcomes as reported on the Oswestry Disability Index by reaching a score of 16% or less in order to demonstrate subjective improvement in pt's condition. . Appropriate and Ongoing Met 7/5/24  Added goal 7/5/24:     Pt will demonstrate increased MedX average isometric strength value by 114% from initial test resulting in improved ability to perform bending, lifting, and carrying activities safely and confidently.  Met 1/3/25      Plan      Patient has attended 20 visits of the Healthy [facility] program focusing aerobic training, isometric testing with dynamic strengthening on MedX machine for spine, whole body strengthening on peripheral strengthening equipment, HEP, and patient education. Patient has completed the Healthy Back Program and is ready to be transitioned into wellness program. Educated on the importance of wellness program and attending weekly in order to maintain strength. Stressed the importance of continuing exercise and maintaining proper body mechanics and ergonomics in order to fully participate in activities of daily living, work, and leisure activities. At this time, patient demonstrates improvement in ability to reduce symptoms, improved posture, improved spinal ROM and improved strength. Discharge handout with HEP given and reviewed all information given. Patient able to demonstrate and verbalize understanding. Patient does plan to attend wellness and is appropriate for transition.         Therapist: Luda Kaur PT  1/3/2025      [facility] OUTPATIENT THERAPY AND WELLNESS   Discharge Note    Name: Jean Claude Dye  Clinic Number: [id number]    Therapy Diagnosis:   Encounter Diagnoses   Name Primary?    Chronic bilateral low back pain without sciatica Yes    Decreased strength of trunk and back      Physician: Linnea Ayala PA-C    Physician Orders: PT Eval and Treat- Healthy Back  Medical Diagnosis from Referral: lumbar back pain  Evaluation Date: 5/8/2024      Date of Last visit: 1/3/25  Total Visits Received: 20    ASSESSMENT      See above    Discharge reason: Other:  Pt has completed Healthy Back program and is ready to transition to wellness    Discharge Oswestry Score: 2%    Goals: met as above    PLAN   This patient is discharged from Physical Therapy      Luda Kaur PT

## 2025-01-15 ENCOUNTER — DOCUMENTATION ONLY (OUTPATIENT)
Dept: REHABILITATION | Facility: HOSPITAL | Age: 45
End: 2025-01-15
Payer: COMMERCIAL

## 2025-01-15 NOTE — PROGRESS NOTES
"Health  Wellness Visit Note    Name: Jean Claude Dye  Clinic Number: [id number]  Physician: Linnea Ayala PA-C  Diagnosis: No diagnosis found.  Past Medical History:   Diagnosis Date    Hypertension     Sleep apnea     cpap       Visit Number: Wellness 1  Precautions: Standard    1st PT visit:  5-8-2024  Year of care end date:  5-8-2025  Mind body plan:Free Wellness Visit  Waiver: [] Signed   Patient level: C    Time In: 7:00a  Time Out: 8:00a  Total Treatment Time: 60 min    Wellness Vision 2025:  Handout on this week's wellness topic Get Outdoors provided  along with a discussion on what it means, the benefits, and suggestions for practice.  Reviewed last week's topic of Gratitude.    Subjective:   Jean Claude reports no pain or discomfort since last wellness visit. She admits to non-compliance with HEP since finishing PT, except when she has pain. She plans to continue wellness after today's visit.    Objective:   Jean Claude completed therapeutic stretches and the following exercises/ fitness machines:     [x] Treadmill x 10'    [x] Rest w/ legs up x 2' (Red PB)  [x] DKTC w/ Red PB x 20 @3-5"  [x] Pelvic Tilt x 20  [x] Pelvic Tilt w/ Marches x 20  [x] Pelvic Tilt w/ Leg Extension 2 x 10  [x] HSS 3 x 30"  [x] Seated bent over stretch 5 x 5"    Fitness Equipment Education Key  (E, I, ) Used this   visit HEP or Alternate Exercise   core lumbar E [x] []    torso rotation E [x] []    leg extension E [x] []    leg curl E [x] []    chest press E [x] []    seated row E [x] []    triceps extension E [x] []    biceps curl E [x] []    hip abduction E [x] []    Hip adduction E [x] []    leg press E [x] []      See exercise log in patient folder for rate of exertion and repetitions completed.     Fitness Machine Education Key:  E=education on equipment initiated and further follow up and education needed  I=independent with  and exercise.  The patient:  Adjusts machines to his/her settings  Uses equipment levers, pins, " weights safely  Maintains safe and correct posture while exercising  Moves through exercise with correct pace and control  Gets on and off equipment safely    Assessment:   Nikea tolerated exercises, stretches, and all peripheral strengthening equipment listed above, without any increase in symptoms.     Plan:  Continue with established plan of care towards wellness goals set by PT and [place], including:  -Increase frequency of HEP use during work day.     1/15/2025

## 2025-01-17 DIAGNOSIS — E66.01 OBESITY, CLASS III, BMI 40-49.9 (MORBID OBESITY): ICD-10-CM

## 2025-01-17 RX ORDER — SEMAGLUTIDE 1 MG/.5ML
1 INJECTION, SOLUTION SUBCUTANEOUS
Qty: 2 ML | Refills: 0 | Status: ACTIVE | OUTPATIENT
Start: 2025-01-17 | End: 2025-02-03 | Stop reason: DRUGHIGH

## 2025-01-31 NOTE — PROGRESS NOTES
Patient ID: Jean Claude Dye is a 44 y.o. Black or  female    Subjective  Chief Complaint: patient presents for medical weight loss management.    Co-morbidities: HTN, JORGE    HPI: Patient continued [facility] with [facility] in October 2024 and is currently managed on 1 mg.     Tolerance to current therapy:  Denies nausea, vomiting, diarrhea, abdominal pain  Endorses constipation managed with docusate, miralax and yogurt - heartburn managed with famotidine     Weight loss history:  Starting weight:    10/22/2024   Recent Readings    Weight (lbs) 329 lb    BMI 48.58 BMI    Current weight:    1/31/2025   Recent Readings    Weight (lbs) 310 lb    BMI 45.77 BMI    % weight loss since GLP-1 initiation: 5.8 %    Objective  Lab Results   Component Value Date     05/27/2024     02/27/2024     04/10/2023     Lab Results   Component Value Date    K 4.2 05/27/2024    K 4.3 02/27/2024    K 3.9 04/10/2023     Lab Results   Component Value Date     05/27/2024     02/27/2024     04/10/2023     Lab Results   Component Value Date    CO2 27 05/27/2024    CO2 30 02/27/2024    CO2 27 04/10/2023     Lab Results   Component Value Date    BUN 15 05/27/2024    BUN 15 02/27/2024    BUN 14 04/10/2023     Lab Results   Component Value Date    GLU 99 05/27/2024    GLU 92 02/27/2024    GLU 95 04/10/2023     Lab Results   Component Value Date    CALCIUM 9.5 05/27/2024    CALCIUM 9.1 02/27/2024    CALCIUM 9.3 04/10/2023     Lab Results   Component Value Date    PROT 8.0 05/27/2024    PROT 7.3 02/27/2024    PROT 7.8 04/10/2023     Lab Results   Component Value Date    ALBUMIN 3.7 05/27/2024    ALBUMIN 4.0 02/27/2024    ALBUMIN 3.6 04/10/2023     Lab Results   Component Value Date    BILITOT 0.4 05/27/2024    BILITOT 0.5 02/27/2024    BILITOT 0.3 04/10/2023     Lab Results   Component Value Date    AST 23 05/27/2024    AST 35 02/27/2024    AST 22 04/10/2023     Lab Results   Component  Value Date    ALT 25 05/27/2024    ALT 28 02/27/2024    ALT 26 04/10/2023     Lab Results   Component Value Date    ANIONGAP 10 05/27/2024    ANIONGAP 5 02/27/2024    ANIONGAP 9 04/10/2023     Lab Results   Component Value Date    CREATININE 1.1 05/27/2024    CREATININE 1.17 02/27/2024    CREATININE 1.2 04/10/2023     Lab Results   Component Value Date    EGFRNORACEVR >60.0 05/27/2024    EGFRNORACEVR 59 (A) 02/27/2024    EGFRNORACEVR 57.2 (A) 04/10/2023     Assessment/Plan  - Increase to Wegovy 1.7 mg x 4 weeks once completed with Wegovy 1 mg  - Then increase to Wegovy 2.4 mg SQ weekly  - RTC in 3 months for follow-up evaluation      Patient consented to pharmacist management via collaborative practice.

## 2025-02-03 ENCOUNTER — OFFICE VISIT (OUTPATIENT)
Dept: INTERNAL MEDICINE | Facility: CLINIC | Age: 45
End: 2025-02-03
Payer: COMMERCIAL

## 2025-02-03 DIAGNOSIS — E66.01 OBESITY, CLASS III, BMI 40-49.9 (MORBID OBESITY): Primary | ICD-10-CM

## 2025-02-03 PROCEDURE — 99499 UNLISTED E&M SERVICE: CPT | Mod: 95,,,

## 2025-02-03 RX ORDER — SEMAGLUTIDE 2.4 MG/.75ML
2.4 INJECTION, SOLUTION SUBCUTANEOUS
Qty: 3 ML | Refills: 2 | Status: ACTIVE | OUTPATIENT
Start: 2025-02-03

## 2025-02-03 RX ORDER — SEMAGLUTIDE 1.7 MG/.75ML
1.7 INJECTION, SOLUTION SUBCUTANEOUS
Qty: 3 ML | Refills: 0 | Status: ACTIVE | OUTPATIENT
Start: 2025-02-03

## 2025-02-05 ENCOUNTER — DOCUMENTATION ONLY (OUTPATIENT)
Dept: REHABILITATION | Facility: HOSPITAL | Age: 45
End: 2025-02-05
Payer: COMMERCIAL

## 2025-02-05 PROBLEM — G89.29 CHRONIC BILATERAL LOW BACK PAIN WITHOUT SCIATICA: Status: RESOLVED | Noted: 2024-05-09 | Resolved: 2025-01-03

## 2025-02-05 PROBLEM — R29.898 DECREASED STRENGTH OF TRUNK AND BACK: Status: RESOLVED | Noted: 2024-05-09 | Resolved: 2025-01-03

## 2025-02-05 PROBLEM — M54.50 CHRONIC BILATERAL LOW BACK PAIN WITHOUT SCIATICA: Status: RESOLVED | Noted: 2024-05-09 | Resolved: 2025-01-03

## 2025-02-05 NOTE — PROGRESS NOTES
"Health  Wellness Visit Note    Name: Jean Claude Dye  Clinic Number: [id number]  Physician: Linnea Ayala PA-C  Diagnosis: No diagnosis found.  Past Medical History:   Diagnosis Date    Hypertension     Sleep apnea     cpap       Visit Number: Wellness 2  Precautions: Standard    1st PT visit:  5-8-2024  Year of care end date:  5-8-2025  Mind body plan: Emp Wellness (month-to-month)  Waiver: [] Signed   Patient level: C    Time In: 7:00a  Time Out: 7:55a  Total Treatment Time: 55 min    Wellness Vision 2025:  Handout on this week's wellness topic Muscular Strength provided  along with a discussion on what it means, the benefits, and suggestions for practice.  Reviewed last week's topic of Balanced Wellbeing.    Subjective:   Jean Claude reports no pain or discomfort since last wellness visit. She has improved compliance with HEP at home and at work. She [place] at work at least 2-3 days/week. Jean Claude plans to continue wellness.    Objective:   Jean Claude completed therapeutic stretches and the following exercises/ fitness machines:     [x] Treadmill x 10'    [x] Rest w/ legs up x 2' (Red PB)  [x] DKTC w/ Red PB x 20 @3-5"  [x] Pelvic Tilt x 20  [x] Pelvic Tilt w/ Marches x 20  [x] Pelvic Tilt w/ Leg Extension 2 x 10  [x] HSS 3 x 30"  [x] Seated bent over stretch 5 x 5"    Fitness Equipment Education Key  (E, I, ) Used this   visit HEP or Alternate Exercise   core lumbar E [x] []    torso rotation E [x] []    leg extension E [x] []    leg curl E [x] []    chest press E [x] []    seated row E [x] []    triceps extension E [x] []    biceps curl E [x] []    hip abduction E [x] []    Hip adduction E [x] []    leg press E [x] []      See exercise log in patient folder for rate of exertion and repetitions completed.     Fitness Machine Education Key:  E=education on equipment initiated and further follow up and education needed  I=independent with  and exercise.  The " patient:  Adjusts machines to his/her settings  Uses equipment levers, pins, weights safely  Maintains safe and correct posture while exercising  Moves through exercise with correct pace and control  Gets on and off equipment safely    Assessment:   Nikea tolerated exercises, stretches, and all peripheral strengthening equipment listed above, without any increase in symptoms.     Plan:  Continue with established plan of care towards wellness goals set by PT and Nikea, including:  -Increase frequency of HEP use during work day.     Health :   Lane Bloom  2/5/2025

## 2025-02-12 ENCOUNTER — DOCUMENTATION ONLY (OUTPATIENT)
Dept: REHABILITATION | Facility: HOSPITAL | Age: 45
End: 2025-02-12
Payer: COMMERCIAL

## 2025-02-12 NOTE — PROGRESS NOTES
"Health  Wellness Visit Note    Name: Jean Claude Dye  Clinic Number: [id number]  Physician: Linnea Ayala PA-C  Diagnosis: No diagnosis found.  Past Medical History:   Diagnosis Date    Hypertension     Sleep apnea     cpap       Visit Number: Wellness 3  Precautions: Standard    1st PT visit:  5-8-2024  Year of care end date:  5-8-2025  Mind body plan: Emp Wellness (month-to-month)  Waiver: [] Signed   Patient level: C    Time In: 12:10p  Time Out: 12:35p  Total Treatment Time: 25 min    Wellness Vision 2025:  Handout on this week's wellness topic Cardiovascular Exercise provided  along with a discussion on what it means, the benefits, and suggestions for practice.  Reviewed last week's topic of Muscular Strength.    Subjective:   Jean Claude reports no pain or discomfort since last wellness visit. She has improved compliance with HEP at home and at work. She was able to walk over from work but was limited on time today. Jean Claude plans to continue wellness and [place] available during the work day.    Objective:   Jean Claude completed therapeutic stretches and the following exercises/ fitness machines:     [x] Treadmill x 10'    [x] Rest w/ legs up x 2' (Red PB)  [x] DKTC w/ Red PB x 20 @3-5"  [x] Pelvic Tilt x 20  [x] Pelvic Tilt w/ Marches x 20  [x] Pelvic Tilt w/ Leg Extension 2 x 10  [x] HSS 3 x 30"  [x] Seated bent over stretch 5 x 5"    Fitness Equipment Education Key  (E, I, ) Used this   visit HEP or Alternate Exercise   core lumbar E [x] []    torso rotation E [x] []    leg extension E [] []    leg curl E [] []    chest press E [] []    seated row E [] []    triceps extension E [] []    biceps curl E [] []    hip abduction E [] []    Hip adduction E [] []    leg press E [] []      See exercise log in patient folder for rate of exertion and repetitions completed.     Fitness Machine Education Key:  E=education on equipment initiated and further follow up and education " needed  I=independent with  and exercise.  The patient:  Adjusts machines to his/her settings  Uses equipment levers, pins, weights safely  Maintains safe and correct posture while exercising  Moves through exercise with correct pace and control  Gets on and off equipment safely    Assessment:   Nikea tolerated exercises, stretches, and all peripheral strengthening equipment listed above, without any increase in symptoms.     Plan:  Continue with established plan of care towards wellness goals set by PT and Nikea, including:  -Increase frequency of HEP use during work day.     Health : Lane Bloom  2/12/2025

## 2025-02-19 ENCOUNTER — PATIENT MESSAGE (OUTPATIENT)
Dept: ADMINISTRATIVE | Facility: OTHER | Age: 45
End: 2025-02-19
Payer: COMMERCIAL

## 2025-02-19 ENCOUNTER — DOCUMENTATION ONLY (OUTPATIENT)
Dept: REHABILITATION | Facility: HOSPITAL | Age: 45
End: 2025-02-19
Payer: COMMERCIAL

## 2025-02-19 NOTE — PROGRESS NOTES
"Health  Wellness Visit Note    Name: Jean Claude Dye  Clinic Number: [id number]  Physician: Linnea Ayala PA-C  Diagnosis: No diagnosis found.  Past Medical History:   Diagnosis Date    Hypertension     Sleep apnea     cpap       Visit Number: Wellness 4  Precautions: Standard    1st PT visit:  5-8-2024  Year of care end date:  5-8-2025  Mind body plan: [facility] (month-to-month)  Waiver: [] Signed   Patient level: C    Time In: 7:05a  Time Out: 8:00a  Total Treatment Time: 55 min    Wellness Vision 2025:  Handout on this week's wellness topic Flexibility provided  along with a discussion on what it means, the benefits, and suggestions for practice.  Reviewed last week's topic of Cardiovascular Exercise.    Subjective:   Jean Claude reports no pain or discomfort since last wellness visit. She remains compliant with HEP, prn at work. Experiencing additional stressors related to work that require increased bending and lifting.      Objective:   Jean Claude completed therapeutic stretches and the following exercises/ fitness machines:     [x] Treadmill x 10'    [x] Rest w/ legs up x 2' (Red PB)  [x] DKTC w/ Red PB x 20 @3-5"  [x] Pelvic Tilt x 20  [x] Pelvic Tilt w/ Marches x 20  [x] Pelvic Tilt w/ Leg Extension 2 x 10  [] HSS 3 x 30"  [] Seated bent over stretch 5 x 5"    Fitness Equipment Education Key  (E, I, ) Used this   visit HEP or Alternate Exercise   core lumbar E [x] []    torso rotation E [x] []    leg extension E [x] []    leg curl E [x] []    chest press E [x] []    seated row E [x] []    triceps extension E [] []    biceps curl E [] []    hip abduction E [x] []    Hip adduction E [x] []    leg press E [x] []      See exercise log in patient folder for rate of exertion and repetitions completed.     Fitness Machine Education Key:  E=education on equipment initiated and further follow up and education needed  I=independent with  and exercise.  The patient:  Adjusts machines to his/her " settings  Uses equipment levers, pins, weights safely  Maintains safe and correct posture while exercising  Moves through exercise with correct pace and control  Gets on and off equipment safely    Assessment:   Nikea tolerated exercises, stretches, and all peripheral strengthening equipment listed above, without any increase in symptoms.     Plan:  Continue with established plan of care towards wellness goals set by PT and [employer], including:  -Increase frequency of HEP use during work day.     Health : Lane Bloom  2/19/2025

## 2025-03-12 ENCOUNTER — LAB VISIT (OUTPATIENT)
Dept: LAB | Facility: HOSPITAL | Age: 45
End: 2025-03-12
Payer: COMMERCIAL

## 2025-03-12 DIAGNOSIS — R30.0 DYSURIA: ICD-10-CM

## 2025-03-12 DIAGNOSIS — R33.9 URINARY RETENTION: Primary | ICD-10-CM

## 2025-03-12 DIAGNOSIS — R33.9 URINARY RETENTION: ICD-10-CM

## 2025-03-12 LAB
BACTERIA #/AREA URNS HPF: ABNORMAL /HPF
BILIRUB UR QL STRIP: NEGATIVE
CLARITY UR: CLEAR
COLOR UR: YELLOW
GLUCOSE UR QL STRIP: NEGATIVE
HGB UR QL STRIP: ABNORMAL
KETONES UR QL STRIP: NEGATIVE
LEUKOCYTE ESTERASE UR QL STRIP: ABNORMAL
MICROSCOPIC COMMENT: ABNORMAL
NITRITE UR QL STRIP: NEGATIVE
PH UR STRIP: 7 [PH] (ref 5–8)
PROT UR QL STRIP: NEGATIVE
RBC #/AREA URNS HPF: 5 /HPF (ref 0–4)
SP GR UR STRIP: 1.02 (ref 1–1.03)
SQUAMOUS #/AREA URNS HPF: 3 /HPF
URN SPEC COLLECT METH UR: ABNORMAL
WBC #/AREA URNS HPF: 30 /HPF (ref 0–5)
WBC CLUMPS URNS QL MICRO: ABNORMAL

## 2025-03-12 PROCEDURE — 87186 SC STD MICRODIL/AGAR DIL: CPT | Performed by: INTERNAL MEDICINE

## 2025-03-12 PROCEDURE — 87086 URINE CULTURE/COLONY COUNT: CPT | Performed by: INTERNAL MEDICINE

## 2025-03-12 PROCEDURE — 87088 URINE BACTERIA CULTURE: CPT | Performed by: INTERNAL MEDICINE

## 2025-03-12 PROCEDURE — 81000 URINALYSIS NONAUTO W/SCOPE: CPT | Mod: PN | Performed by: INTERNAL MEDICINE

## 2025-03-14 DIAGNOSIS — N30.00 ACUTE CYSTITIS WITHOUT HEMATURIA: Primary | ICD-10-CM

## 2025-03-14 LAB — BACTERIA UR CULT: ABNORMAL

## 2025-03-14 RX ORDER — NITROFURANTOIN 25; 75 MG/1; MG/1
100 CAPSULE ORAL 2 TIMES DAILY
Qty: 10 CAPSULE | Refills: 0 | Status: SHIPPED | OUTPATIENT
Start: 2025-03-14 | End: 2025-03-19

## 2025-03-25 PROBLEM — J30.1 SEASONAL ALLERGIC RHINITIS DUE TO POLLEN: Status: ACTIVE | Noted: 2025-03-25

## 2025-04-02 DIAGNOSIS — I83.813 VARICOSE VEINS OF BOTH LOWER EXTREMITIES WITH PAIN: Primary | ICD-10-CM

## 2025-05-27 ENCOUNTER — PATIENT OUTREACH (OUTPATIENT)
Dept: OTHER | Facility: OTHER | Age: 45
End: 2025-05-27
Payer: COMMERCIAL

## 2025-06-08 ENCOUNTER — PATIENT MESSAGE (OUTPATIENT)
Dept: OTHER | Facility: OTHER | Age: 45
End: 2025-06-08
Payer: COMMERCIAL

## 2025-06-16 ENCOUNTER — OFFICE VISIT (OUTPATIENT)
Dept: INTERNAL MEDICINE | Facility: CLINIC | Age: 45
End: 2025-06-16
Payer: COMMERCIAL

## 2025-06-16 ENCOUNTER — PATIENT MESSAGE (OUTPATIENT)
Dept: INTERNAL MEDICINE | Facility: CLINIC | Age: 45
End: 2025-06-16

## 2025-06-16 DIAGNOSIS — E66.01 OBESITY, CLASS III, BMI 40-49.9 (MORBID OBESITY): Primary | ICD-10-CM

## 2025-06-16 PROCEDURE — 99499 UNLISTED E&M SERVICE: CPT | Mod: 95,,,

## 2025-06-16 RX ORDER — SEMAGLUTIDE 2.4 MG/.75ML
2.4 INJECTION, SOLUTION SUBCUTANEOUS
Qty: 3 ML | Refills: 5 | Status: ACTIVE | OUTPATIENT
Start: 2025-06-16

## 2025-06-16 NOTE — PROGRESS NOTES
Patient ID: Jean Claude Dey is a 44 y.o. Black or  female    Subjective  Chief Complaint: patient presents for medical weight loss management.    Co-morbidities: HTN, JORGE    HPI: Patient continued [facility] with [facility] in October 2024 and is currently managed on Wegovy 2.4 mg.     Tolerance to current therapy:  Denies nausea, vomiting, diarrhea, abdominal pain  Endorses constipation managed with fluid intake and PRN Miralax    Weight loss history:  Starting weight:    10/22/2024   Recent Readings    Weight (lbs) 329 lb    BMI 48.58 BMI    Current weight:    6/11/2025   Recent Readings    Weight (lbs) 288 lb    BMI 42.53 BMI    % weight loss since GLP-1 initiation: 12.5 %    Objective  Lab Results   Component Value Date     05/27/2024     02/27/2024     04/10/2023     Lab Results   Component Value Date    K 4.2 05/27/2024    K 4.3 02/27/2024    K 3.9 04/10/2023     Lab Results   Component Value Date     05/27/2024     02/27/2024     04/10/2023     Lab Results   Component Value Date    CO2 27 05/27/2024    CO2 30 02/27/2024    CO2 27 04/10/2023     Lab Results   Component Value Date    BUN 15 05/27/2024    BUN 15 02/27/2024    BUN 14 04/10/2023     Lab Results   Component Value Date    GLU 99 05/27/2024    GLU 92 02/27/2024    GLU 95 04/10/2023     Lab Results   Component Value Date    CALCIUM 9.5 05/27/2024    CALCIUM 9.1 02/27/2024    CALCIUM 9.3 04/10/2023     Lab Results   Component Value Date    PROT 8.0 05/27/2024    PROT 7.3 02/27/2024    PROT 7.8 04/10/2023     Lab Results   Component Value Date    ALBUMIN 3.7 05/27/2024    ALBUMIN 4.0 02/27/2024    ALBUMIN 3.6 04/10/2023     Lab Results   Component Value Date    BILITOT 0.4 05/27/2024    BILITOT 0.5 02/27/2024    BILITOT 0.3 04/10/2023     Lab Results   Component Value Date    AST 23 05/27/2024    AST 35 02/27/2024    AST 22 04/10/2023     Lab Results   Component Value Date    ALT 25  05/27/2024    ALT 28 02/27/2024    ALT 26 04/10/2023     Lab Results   Component Value Date    ANIONGAP 10 05/27/2024    ANIONGAP 5 02/27/2024    ANIONGAP 9 04/10/2023     Lab Results   Component Value Date    CREATININE 1.1 05/27/2024    CREATININE 1.17 02/27/2024    CREATININE 1.2 04/10/2023     Lab Results   Component Value Date    EGFRNORACEVR >60.0 05/27/2024    EGFRNORACEVR 59 (A) 02/27/2024    EGFRNORACEVR 57.2 (A) 04/10/2023     Assessment/Plan  - Continue Wegovy 2.4 mg SQ weekly  - RTC in 6 months for follow-up evaluation    Patient consented to pharmacist management via collaborative practice.

## 2025-08-07 RX ORDER — MIRABEGRON 50 MG/1
50 TABLET, FILM COATED, EXTENDED RELEASE ORAL DAILY
Qty: 30 TABLET | Refills: 11 | OUTPATIENT
Start: 2025-08-07 | End: 2026-08-07

## 2025-08-11 ENCOUNTER — TELEPHONE (OUTPATIENT)
Dept: INTERNAL MEDICINE | Facility: CLINIC | Age: 45
End: 2025-08-11
Payer: COMMERCIAL